# Patient Record
Sex: FEMALE | Race: BLACK OR AFRICAN AMERICAN | NOT HISPANIC OR LATINO | Employment: UNEMPLOYED | URBAN - METROPOLITAN AREA
[De-identification: names, ages, dates, MRNs, and addresses within clinical notes are randomized per-mention and may not be internally consistent; named-entity substitution may affect disease eponyms.]

---

## 2018-05-22 ENCOUNTER — HOSPITAL ENCOUNTER (EMERGENCY)
Facility: MEDICAL CENTER | Age: 43
End: 2018-05-22
Attending: EMERGENCY MEDICINE
Payer: MEDICAID

## 2018-05-22 VITALS
HEART RATE: 96 BPM | BODY MASS INDEX: 27.64 KG/M2 | HEIGHT: 66 IN | OXYGEN SATURATION: 97 % | WEIGHT: 171.96 LBS | TEMPERATURE: 97.9 F | RESPIRATION RATE: 16 BRPM | DIASTOLIC BLOOD PRESSURE: 70 MMHG | SYSTOLIC BLOOD PRESSURE: 121 MMHG

## 2018-05-22 DIAGNOSIS — K02.9 DENTAL CARIES: ICD-10-CM

## 2018-05-22 PROCEDURE — 700102 HCHG RX REV CODE 250 W/ 637 OVERRIDE(OP): Performed by: EMERGENCY MEDICINE

## 2018-05-22 PROCEDURE — 99283 EMERGENCY DEPT VISIT LOW MDM: CPT

## 2018-05-22 PROCEDURE — A9270 NON-COVERED ITEM OR SERVICE: HCPCS | Performed by: EMERGENCY MEDICINE

## 2018-05-22 RX ORDER — AMOXICILLIN 500 MG/1
500 CAPSULE ORAL 3 TIMES DAILY
Qty: 21 CAP | Refills: 0 | Status: SHIPPED | OUTPATIENT
Start: 2018-05-22 | End: 2018-06-01

## 2018-05-22 RX ORDER — NAPROXEN 500 MG/1
500 TABLET ORAL 2 TIMES DAILY WITH MEALS
Qty: 60 TAB | Refills: 0 | Status: SHIPPED | OUTPATIENT
Start: 2018-05-22 | End: 2022-03-09

## 2018-05-22 RX ORDER — NAPROXEN 500 MG/1
500 TABLET ORAL ONCE
Status: COMPLETED | OUTPATIENT
Start: 2018-05-22 | End: 2018-05-22

## 2018-05-22 RX ADMIN — NAPROXEN 500 MG: 500 TABLET ORAL at 12:35

## 2018-05-22 ASSESSMENT — PAIN SCALES - GENERAL
PAINLEVEL_OUTOF10: 5
PAINLEVEL_OUTOF10: 5

## 2018-05-22 NOTE — ED NOTES
Patient received discharge instructions, verbalized understanding and intent to follow. Provided with dental referral sheet. Denied any additional questions or concerns. Stable and ambulatory to discharge with steady gait. Belongings with patient at time of discharge.

## 2018-05-22 NOTE — ED NOTES
Agree with triage note. Patient alert and oriented x 4, respirations even and unlabored. No acute distress. Stretcher low, wheels locked, call light within reach.

## 2018-05-22 NOTE — ED PROVIDER NOTES
ED Provider Note    Scribed for Lisandra Lagunas M.D. by Deonte Andrade. 5/22/2018  12:01 PM    Means of arrival: Walk-in  History obtained from: Patient  History limited by: None    CHIEF COMPLAINT  Chief Complaint   Patient presents with   • Dental Pain     L lower tooth       HPI  Rama Bhatt is a 43 y.o. female who presents to the Emergency Department complaining of left sided dental pain and numbness that began three weeks ago and has been gradually increasing in severity. The patient took leftover amoxicillin today and she has not been able to seen by a dentist. She denies fever.    REVIEW OF SYSTEMS  HEENT:  Positive for dental pain and numbness.  Endocrine: no fevers    See history of present illness.   E    PAST MEDICAL HISTORY   has a past medical history of Bell's palsy (2004).    SURGICAL HISTORY   has a past surgical history that includes gyn surgery.    SOCIAL HISTORY  Social History   Substance Use Topics   • Smoking status: Current Every Day Smoker           Comment: 1/2 ppd   • Alcohol use No      History   Drug Use No       FAMILY HISTORY  None noted.    CURRENT MEDICATIONS  No current facility-administered medications on file prior to encounter.      Current Outpatient Prescriptions on File Prior to Encounter   Medication Sig Dispense Refill   • hydrocodone-acetaminophen (NORCO) 5-325 MG TABS per tablet Take 1-2 Tabs by mouth every 6 hours as needed. 12 Tab 0   • ibuprofen (MOTRIN) 200 MG TABS Take 200 mg by mouth every 6 hours as needed.     • acetaminophen-codeine #3 (TYLENOL #3) 300-30 MG TABS Take 1-2 Tabs by mouth every four hours as needed. Maximum 12 tabs in 24 hours      • Diphenhydramine-APAP, sleep, (TYLENOL PM EXTRA STRENGTH PO) Take  by mouth.     • oxycodone-acetaminophen (PERCOCET) 5-325 MG TABS Take 1-2 Tabs by mouth every four hours as needed for Mild Pain (pain). 20 Each 0       ALLERGIES  Allergies   Allergen Reactions   • Nkda [No Known Drug Allergy]        PHYSICAL  EXAM  VITAL SIGNS:     Constitutional: Well developed, Well nourished, Mild distress secondary to pain, Non-toxic appearance.   HEENT: Normocephalic, Atraumatic,  external ears normal, pharynx pink,  Mucous  Membranes moist, No rhinorrhea or mucosal edema. Dental caries in the left lower posterior jaw. No drooling, trismus, facial swelling. No voice change  Eyes: PERRL, EOMI, Conjunctiva normal, No discharge.   Neck: Normal range of motion, No stiffness, No tenderness, Supple, No stridor.   Lymphatic: No lymphadenopathy    Skin: Warm, Dry, No erythema, No rash,   Musculoskeletal: Good range of motion in all major joints. No tenderness to palpation or major deformities noted.   Neurologic: Alert & awake, no focal deficits  Psychiatric: Affect normal    COURSE & MEDICAL DECISION MAKING  Nursing notes, VS, PMSFHx reviewed in chart.     12:01 PM - Patient seen and examined at bedside. Patient will be treated with naproxen tablet 500 mg.  I discussed her above findings and plans for discharge with a prescription for Naprosyn, Amoxil, and Orajel baby tooth/gum. She was given a referral to [facility] and instructed to return to the ED if her symptoms worsen. Patient understands and agrees.    The patient will return for new or worsening symptoms and is stable at the time of discharge.    DISPOSITION:  Patient will be discharged home in stable condition.    FOLLOW UP:  Selvin Crum D.D.S.  [address] [place] [id number]  [phone]    Call in 1 day  to establish care, for recheck, As needed, If symptoms worsen      OUTPATIENT MEDICATIONS:  New Prescriptions    AMOXICILLIN (AMOXIL) 500 MG CAP    Take 1 Cap by mouth 3 times a day for 10 days.    NAPROXEN (NAPROSYN) 500 MG TAB    Take 1 Tab by mouth 2 times a day, with meals.    ORAL HYGIENE PRODUCTS (ORAJEL BABY TOOTH/GUM) 2-0.12 % GEL    Spray 1 Application in mouth/throat 3 times a day.       FINAL IMPRESSION  1. Dental caries          Deonte GORDON  Raymond (Scribe), am scribing for, and in the presence of, Lisandra Lagunas M.D..    Electronically signed by: Deonte Andrade (Scribe), 5/22/2018    ILisandra M.D. personally performed the services described in this documentation, as scribed by Deonte Andrade in my presence, and it is both accurate and complete.    The note accurately reflects work and decisions made by me.  Lisandra Lagunas  5/22/2018  12:39 PM

## 2018-05-22 NOTE — ED TRIAGE NOTES
"Chief Complaint   Patient presents with   • Dental Pain     L lower tooth     X a few weeks. Pt went to [facility] clinic, can't get tooth taken care of until July. Received abx.     /70   Pulse 96   Temp 36.6 °C (97.9 °F)   Resp 16   Ht 1.676 m (5' 6\")   Wt 78 kg (171 lb 15.3 oz)   SpO2 97%   BMI 27.75 kg/m²     Pt Informed regarding triage process and verbalized understanding to inform triage tech or RN for any changes in condition.  Placed in lobby.    "

## 2018-05-22 NOTE — DISCHARGE INSTRUCTIONS
Dental Caries  Dental caries (cavities) are areas of tooth decay. Cavities are usually caused by a combination of poor dental care; sugar; tobacco, alcohol, and drug abuse; decreased saliva production; and receding gums. If cavities are not treated by a dentist, they grow in size. This can cause toothaches, infection, and loss of the tooth.  Cavities of the outer tooth enamel do not cause symptoms. Dental pain from cold drinks may be the first sign the enamel has broken down and decay has spread toward the root of the tooth. This can cause the tooth to die or become infected. If a cavity is treated before it causes toothache, the tooth can usually be saved. Cavities can be prevented by good oral hygiene. Brushing your teeth in the morning and before bed, and using dental floss once daily helps remove plaque and reduce bacteria.  Candy, soft drinks, and other sources of sugar promote tooth decay by promoting the growth of bacteria in the mouth. Proper diet, fluoride, dental cleaning, and fillings are important in preventing the loss of teeth from decay. Antibiotics, root canal treatment, or dental extraction may be needed if the decay is severe. Take any pain medication or antibiotics as directed by your caregiver. It is important that you follow up with a dentist for definitive care.  SEEK MEDICAL CARE IF:   · You or your child has an oral temperature above 102° F (38.9° C).   · There is difficulty opening the mouth.   · There is difficulty swallowing or handling secretions.   · There is difficulty breathing.   · There is chest pain.   · There are worsening or concerning symptoms.   Document Released: 01/25/2006 Document Revised: 03/11/2013 Document Reviewed: 04/12/2011  ExitCare® Patient Information ©2013 [employer].Dental Care and Dentist Visits  Dental care supports good overall health. Regular dental visits can also help you avoid dental pain, bleeding, infection, and other more serious health problems in  the future. It is important to keep the mouth healthy because diseases in the teeth, gums, and other oral tissues can spread to other areas of the body. Some problems, such as diabetes, heart disease, and pre-term labor have been associated with poor oral health.   See your dentist every 6 months. If you experience emergency problems such as a toothache or broken tooth, go to the dentist right away. If you see your dentist regularly, you may catch problems early. It is easier to be treated for problems in the early stages.   WHAT TO EXPECT AT A DENTIST VISIT   Your dentist will look for many common oral health problems and recommend proper treatment. At your regular dental visit, you can expect:  · Gentle cleaning of the teeth and gums. This includes scraping and polishing. This helps to remove the sticky substance around the teeth and gums (plaque). Plaque forms in the mouth shortly after eating. Over time, plaque hardens on the teeth as tartar. If tartar is not removed regularly, it can cause problems. Cleaning also helps remove stains.  · Periodic X-rays. These pictures of the teeth and supporting bone will help your dentist assess the health of your teeth.  · Periodic fluoride treatments. Fluoride is a natural mineral shown to help strengthen teeth. Fluoride treatment involves applying a fluoride gel or varnish to the teeth. It is most commonly done in children.  · Examination of the mouth, tongue, jaws, teeth, and gums to look for any oral health problems, such as:  ¨ Cavities (dental caries). This is decay on the tooth caused by plaque, sugar, and acid in the mouth. It is best to catch a cavity when it is small.  ¨ Inflammation of the gums caused by plaque buildup (gingivitis).  ¨ Problems with the mouth or malformed or misaligned teeth.  ¨ Oral cancer or other diseases of the soft tissues or jaws.   KEEP YOUR TEETH AND GUMS HEALTHY  For healthy teeth and gums, follow these general guidelines as well as your  dentist's specific advice:  · Have your teeth professionally cleaned at the dentist every 6 months.  · Brush twice daily with a fluoride toothpaste.  · Floss your teeth daily.   · Ask your dentist if you need fluoride supplements, treatments, or fluoride toothpaste.  · Eat a healthy diet. Reduce foods and drinks with added sugar.  · Avoid smoking.  TREATMENT FOR ORAL HEALTH PROBLEMS  If you have oral health problems, treatment varies depending on the conditions present in your teeth and gums.  · Your caregiver will most likely recommend good oral hygiene at each visit.  · For cavities, gingivitis, or other oral health disease, your caregiver will perform a procedure to treat the problem. This is typically done at a separate appointment. Sometimes your caregiver will refer you to another dental specialist for specific tooth problems or for surgery.  SEEK IMMEDIATE DENTAL CARE IF:  · You have pain, bleeding, or soreness in the gum, tooth, jaw, or mouth area.  · A permanent tooth becomes loose or  from the gum socket.  · You experience a blow or injury to the mouth or jaw area.     This information is not intended to replace advice given to you by your health care provider. Make sure you discuss any questions you have with your health care provider.     Document Released: 08/29/2012 Document Revised: 03/11/2013 Document Reviewed: 08/29/2012  [employer] Interactive Patient Education ©2016 [employer]

## 2022-01-24 ENCOUNTER — HOSPITAL ENCOUNTER (OUTPATIENT)
Dept: LAB | Facility: MEDICAL CENTER | Age: 47
End: 2022-01-24
Attending: PHYSICIAN ASSISTANT
Payer: MEDICAID

## 2022-01-24 ENCOUNTER — OFFICE VISIT (OUTPATIENT)
Dept: URGENT CARE | Facility: CLINIC | Age: 47
End: 2022-01-24
Payer: MEDICAID

## 2022-01-24 ENCOUNTER — HOSPITAL ENCOUNTER (OUTPATIENT)
Dept: RADIOLOGY | Facility: MEDICAL CENTER | Age: 47
End: 2022-01-24
Attending: PHYSICIAN ASSISTANT
Payer: MEDICAID

## 2022-01-24 VITALS
TEMPERATURE: 96.9 F | DIASTOLIC BLOOD PRESSURE: 76 MMHG | HEIGHT: 67 IN | WEIGHT: 190.4 LBS | BODY MASS INDEX: 29.88 KG/M2 | RESPIRATION RATE: 12 BRPM | SYSTOLIC BLOOD PRESSURE: 122 MMHG | OXYGEN SATURATION: 94 % | HEART RATE: 96 BPM

## 2022-01-24 DIAGNOSIS — R51.9 NONINTRACTABLE HEADACHE, UNSPECIFIED CHRONICITY PATTERN, UNSPECIFIED HEADACHE TYPE: ICD-10-CM

## 2022-01-24 DIAGNOSIS — H53.9 VISION CHANGES: ICD-10-CM

## 2022-01-24 DIAGNOSIS — Q67.0 FACIAL ASYMMETRY: ICD-10-CM

## 2022-01-24 LAB
ANION GAP SERPL CALC-SCNC: 13 MMOL/L (ref 7–16)
BASOPHILS # BLD AUTO: 1.3 % (ref 0–1.8)
BASOPHILS # BLD: 0.18 K/UL (ref 0–0.12)
BUN SERPL-MCNC: 6 MG/DL (ref 8–22)
CALCIUM SERPL-MCNC: 9.4 MG/DL (ref 8.5–10.5)
CHLORIDE SERPL-SCNC: 102 MMOL/L (ref 96–112)
CO2 SERPL-SCNC: 24 MMOL/L (ref 20–33)
CREAT SERPL-MCNC: 0.73 MG/DL (ref 0.5–1.4)
EOSINOPHIL # BLD AUTO: 0.06 K/UL (ref 0–0.51)
EOSINOPHIL NFR BLD: 0.4 % (ref 0–6.9)
ERYTHROCYTE [DISTWIDTH] IN BLOOD BY AUTOMATED COUNT: 47.6 FL (ref 35.9–50)
GLUCOSE SERPL-MCNC: 138 MG/DL (ref 65–99)
HCT VFR BLD AUTO: 42.7 % (ref 37–47)
HGB BLD-MCNC: 14.6 G/DL (ref 12–16)
IMM GRANULOCYTES # BLD AUTO: 0.05 K/UL (ref 0–0.11)
IMM GRANULOCYTES NFR BLD AUTO: 0.4 % (ref 0–0.9)
LYMPHOCYTES # BLD AUTO: 4.28 K/UL (ref 1–4.8)
LYMPHOCYTES NFR BLD: 30.9 % (ref 22–41)
MCH RBC QN AUTO: 31.6 PG (ref 27–33)
MCHC RBC AUTO-ENTMCNC: 34.2 G/DL (ref 33.6–35)
MCV RBC AUTO: 92.4 FL (ref 81.4–97.8)
MONOCYTES # BLD AUTO: 0.68 K/UL (ref 0–0.85)
MONOCYTES NFR BLD AUTO: 4.9 % (ref 0–13.4)
NEUTROPHILS # BLD AUTO: 8.6 K/UL (ref 2–7.15)
NEUTROPHILS NFR BLD: 62.1 % (ref 44–72)
NRBC # BLD AUTO: 0 K/UL
NRBC BLD-RTO: 0 /100 WBC
PLATELET # BLD AUTO: 390 K/UL (ref 164–446)
PMV BLD AUTO: 9.8 FL (ref 9–12.9)
POTASSIUM SERPL-SCNC: 3.7 MMOL/L (ref 3.6–5.5)
RBC # BLD AUTO: 4.62 M/UL (ref 4.2–5.4)
SODIUM SERPL-SCNC: 139 MMOL/L (ref 135–145)
TSH SERPL DL<=0.005 MIU/L-ACNC: 3.4 UIU/ML (ref 0.38–5.33)
WBC # BLD AUTO: 13.9 K/UL (ref 4.8–10.8)

## 2022-01-24 PROCEDURE — 99204 OFFICE O/P NEW MOD 45 MIN: CPT | Performed by: PHYSICIAN ASSISTANT

## 2022-01-24 PROCEDURE — 80048 BASIC METABOLIC PNL TOTAL CA: CPT

## 2022-01-24 PROCEDURE — 85025 COMPLETE CBC W/AUTO DIFF WBC: CPT

## 2022-01-24 PROCEDURE — 84443 ASSAY THYROID STIM HORMONE: CPT

## 2022-01-24 PROCEDURE — 70450 CT HEAD/BRAIN W/O DYE: CPT

## 2022-01-24 PROCEDURE — 36415 COLL VENOUS BLD VENIPUNCTURE: CPT

## 2022-01-25 ASSESSMENT — ENCOUNTER SYMPTOMS
FOCAL WEAKNESS: 0
VOMITING: 0
EYE REDNESS: 0
EYE DISCHARGE: 0
SENSORY CHANGE: 0
FOREIGN BODY SENSATION: 0
DIZZINESS: 0
TINGLING: 0
SHORTNESS OF BREATH: 0
PHOTOPHOBIA: 0
FEVER: 0
NAUSEA: 0
COUGH: 0
HEADACHES: 1
BLURRED VISION: 1
MYALGIAS: 1
DOUBLE VISION: 0

## 2022-01-26 DIAGNOSIS — Q67.0 FACIAL ASYMMETRY: ICD-10-CM

## 2022-01-26 NOTE — PROGRESS NOTES
Subjective     Rama Bhatt is a 46 y.o. female who presents with Eye Problem (bilateral eyes (L) pain, blurred vision, head pain. Swollen throat. x 2/3 weeks (pt concerned thyroid)  )            Patient is a 46-year-old female who presents to urgent care with concern regarding her left eye.  Patient reports that she noticed approximately 3 to 4 weeks ago that her left eye appears to be bigger than her right.  Since then patient has noticed subtle symptoms of headaches, intermittent blurry vision of which she will develop episodes where her vision is blurry and needing to focus more.  She is uncertain which eye this is present and as she will of them in both eyes sometimes.  Patient does report a left-sided headache and readily admits that it is unusual for her to get headaches of which has been present intermittently for the last 2 weeks.  On further discussion patient does have history of Bell's palsy of which the current symptoms she has are slightly different.  Previously with Bell's palsy she would have tingling to the face of which she is currently not having at this time.  Of note on exam it was noted that patient may have facial asymmetry-patient was unaware of such as she did not feel that the asymmetry was to her face more to her eyes.    Eye Problem   The left eye is affected. This is a new problem. The current episode started 1 to 4 weeks ago. The problem occurs daily. The problem has been unchanged. There was no injury mechanism. Associated symptoms include blurred vision. Pertinent negatives include no eye discharge, double vision, eye redness, fever, foreign body sensation, nausea, photophobia, tingling or vomiting.       Review of Systems   Constitutional: Positive for malaise/fatigue. Negative for fever.   Eyes: Positive for blurred vision. Negative for double vision, photophobia, discharge and redness.   Respiratory: Negative for cough and shortness of breath.    Cardiovascular: Negative  "for chest pain and leg swelling.   Gastrointestinal: Negative for nausea and vomiting.   Musculoskeletal: Positive for myalgias.   Neurological: Positive for headaches. Negative for dizziness, tingling, sensory change and focal weakness.   All other systems reviewed and are negative.             Objective     /76   Pulse 96   Temp 36.1 °C (96.9 °F)   Resp 12   Ht 1.702 m (5' 7\")   Wt 86.4 kg (190 lb 6.4 oz)   SpO2 94%   BMI 29.82 kg/m²    PMH:  has a past medical history of Bell's palsy ().  MEDS: Reviewed .   ALLERGIES:   Allergies   Allergen Reactions   • Nkda [No Known Drug Allergy]      SURGHX:   Past Surgical History:   Procedure Laterality Date   • GYN SURGERY           SOCHX:  reports that she has been smoking. She has never used smokeless tobacco. She reports that she does not drink alcohol and does not use drugs.  FH: Family history was reviewed, no pertinent findings to report    Physical Exam  Vitals reviewed.   Constitutional:       General: She is not in acute distress.     Appearance: She is well-developed.   HENT:      Head: Normocephalic and atraumatic.      Right Ear: External ear normal.      Left Ear: External ear normal.      Nose: No congestion.      Mouth/Throat:      Comments: Poor dentition.     Eyes:      General: No visual field deficit.     Conjunctiva/sclera: Conjunctivae normal.      Pupils: Pupils are equal, round, and reactive to light.   Neck:      Trachea: No tracheal deviation.   Cardiovascular:      Rate and Rhythm: Normal rate.   Pulmonary:      Effort: Pulmonary effort is normal.   Musculoskeletal:         General: Normal range of motion.      Cervical back: Normal range of motion and neck supple.   Skin:     General: Skin is warm.      Findings: No rash.      Comments: No rash to area exposed during the visit today.    Neurological:      Mental Status: She is alert and oriented to person, place, and time.      Cranial Nerves: Facial asymmetry present. "      Motor: No weakness.      Coordination: Romberg sign negative. Coordination normal. Finger-Nose-Finger Test normal. Rapid alternating movements normal.      Gait: Gait normal.   Psychiatric:         Behavior: Behavior normal.         Thought Content: Thought content normal.         Judgment: Judgment normal.                             Assessment & Plan        1. Facial asymmetry  - CT-HEAD W/O; Future  - CBC WITH DIFFERENTIAL; Future  - Basic Metabolic Panel; Future  - TSH; Future    2. Vision changes  - CT-HEAD W/O; Future  - CBC WITH DIFFERENTIAL; Future  - Basic Metabolic Panel; Future  - TSH; Future    3. Nonintractable headache, unspecified chronicity pattern, unspecified headache type  - CT-HEAD W/O; Future  - CBC WITH DIFFERENTIAL; Future  - Basic Metabolic Panel; Future  - TSH; Future            Does appear that patient is with noted facial droop on the right side.  Patient does have history of Bell's palsy of which she does report previous utilization of antivirals along with steroids did not make a difference to symptoms.  Patient is also 2 to 3 weeks in the least from onset of facial asymmetry as she noticed discrepancy in her eye size approximately 4 weeks ago.  As patient does have associated visual changes along with noted headache a CT head was ordered for further evaluation of intracranial process.  Patient was specifically concerned regarding possible thyroid disease today patient has not had updated blood work will send off for thyroid, along with renal function and electrolyte functions.  [facility].    Appropriate PPE worn at all times by provider.   Pt. Had face mask on throughout entirety of the visit other than oropharyngeal examination today.     Side effects of OTC or prescribed medications discussed.     DDX, Supportive care, and indications for immediate follow-up discussed with patient.    Instructed to return to clinic or nearest  emergency department if we are not available for any change in condition, further concerns, or worsening of symptoms.    The patient and/or guardian demonstrated a good understanding and agreed with the treatment plan.    Please note that this dictation was created using voice recognition software. I have made every reasonable attempt to correct obvious errors, but I expect that there are errors of grammar and possibly content that I did not discover before finalizing the note.

## 2022-01-27 ENCOUNTER — TELEPHONE (OUTPATIENT)
Dept: SCHEDULING | Facility: IMAGING CENTER | Age: 47
End: 2022-01-27

## 2022-03-09 ENCOUNTER — OFFICE VISIT (OUTPATIENT)
Dept: MEDICAL GROUP | Facility: MEDICAL CENTER | Age: 47
End: 2022-03-09
Attending: NURSE PRACTITIONER
Payer: MEDICAID

## 2022-03-09 VITALS
SYSTOLIC BLOOD PRESSURE: 112 MMHG | RESPIRATION RATE: 16 BRPM | BODY MASS INDEX: 27.99 KG/M2 | WEIGHT: 178.3 LBS | OXYGEN SATURATION: 96 % | TEMPERATURE: 98.4 F | DIASTOLIC BLOOD PRESSURE: 78 MMHG | HEIGHT: 67 IN | HEART RATE: 98 BPM

## 2022-03-09 DIAGNOSIS — Z23 NEED FOR VACCINATION: ICD-10-CM

## 2022-03-09 DIAGNOSIS — Z13.29 SCREENING FOR ENDOCRINE, NUTRITIONAL, METABOLIC AND IMMUNITY DISORDER: ICD-10-CM

## 2022-03-09 DIAGNOSIS — Z12.31 ENCOUNTER FOR SCREENING MAMMOGRAM FOR MALIGNANT NEOPLASM OF BREAST: ICD-10-CM

## 2022-03-09 DIAGNOSIS — Z13.21 SCREENING FOR ENDOCRINE, NUTRITIONAL, METABOLIC AND IMMUNITY DISORDER: ICD-10-CM

## 2022-03-09 DIAGNOSIS — T78.40XA ALLERGY, INITIAL ENCOUNTER: ICD-10-CM

## 2022-03-09 DIAGNOSIS — Z13.0 SCREENING FOR ENDOCRINE, NUTRITIONAL, METABOLIC AND IMMUNITY DISORDER: ICD-10-CM

## 2022-03-09 DIAGNOSIS — Z13.228 SCREENING FOR ENDOCRINE, NUTRITIONAL, METABOLIC AND IMMUNITY DISORDER: ICD-10-CM

## 2022-03-09 DIAGNOSIS — Z76.89 ENCOUNTER TO ESTABLISH CARE: ICD-10-CM

## 2022-03-09 PROCEDURE — 99203 OFFICE O/P NEW LOW 30 MIN: CPT | Mod: 25 | Performed by: NURSE PRACTITIONER

## 2022-03-09 PROCEDURE — 90715 TDAP VACCINE 7 YRS/> IM: CPT

## 2022-03-09 PROCEDURE — 99213 OFFICE O/P EST LOW 20 MIN: CPT | Mod: 25 | Performed by: NURSE PRACTITIONER

## 2022-03-09 PROCEDURE — 99214 OFFICE O/P EST MOD 30 MIN: CPT | Performed by: NURSE PRACTITIONER

## 2022-03-09 RX ORDER — LORATADINE 10 MG/1
10 TABLET ORAL DAILY
Qty: 30 TABLET | Refills: 0 | Status: SHIPPED | OUTPATIENT
Start: 2022-03-09 | End: 2023-12-04

## 2022-03-09 ASSESSMENT — PATIENT HEALTH QUESTIONNAIRE - PHQ9: CLINICAL INTERPRETATION OF PHQ2 SCORE: 0

## 2022-03-10 NOTE — ASSESSMENT & PLAN NOTE
Discussed health history and maintenance   Flu vaccine - Declined   Colon Ca screening - Not applicable   Mammogram- Ordered   Pap smear - Scheduled- to be scheduled here   STD Screening- Ordered  Preventative screening labs ordered - Ordered will have patient follow up in 2-4 weeks  Tdap provided

## 2022-03-10 NOTE — ASSESSMENT & PLAN NOTE
Given prolonged duration and no obvious signs of infection we will trial Claritin to see if this is allergy triggered   Throat has some cobblestoning appearance to posterior pharynx, TM clear and non bulging.   Will follow up with me in 2 weeks if no improvement.

## 2022-03-10 NOTE — PROGRESS NOTES
Chief Complaint   Patient presents with   • Establish Care       Subjective:     HPI:   Rama Bhatt is a 46 y.o. female here to discuss the evaluation and management of:        Problem   Encounter to Establish Care    Patient here to establish care. Has not had a primary in some time. Was recently seen about a month ago at urgent care for possible [place] Palsy episode. Had also been having headaches at the time so a CT scan of her head was completed. No abnormal findings noted. Patient was concerned that maybe there was an issue with her thyroid. TSh levels came back within normal limits.      Allergies    Patient has had persistent mucus and congestion for about a month. Denies fevers, chills. Does state some ringing and pressure in her ears which has benefited from Nyquil. States she feels like she keeps coughing up a lot of mucus.          ROS  See HPI       Allergies   Allergen Reactions   • Nkda [No Known Drug Allergy]        Current medicines (including changes today)  Current Outpatient Medications   Medication Sig Dispense Refill   • loratadine (CLARITIN) 10 MG Tab Take 1 Tablet by mouth every day. 30 Tablet 0     No current facility-administered medications for this visit.       Social History     Tobacco Use   • Smoking status: Current Every Day Smoker     Packs/day: 0.50     Years: 7.00     Pack years: 3.50   • Smokeless tobacco: Never Used   Vaping Use   • Vaping Use: Never used   Substance Use Topics   • Alcohol use: Not Currently     Comment: Pt. states she quit apx. 1 mo ago when she noticed her blood sugars were high. CS03/09/2022   • Drug use: Yes     Types: Marijuana, Inhaled     Comment: On occasional use, apx one time a month 03/09/2022       Patient Active Problem List    Diagnosis Date Noted   • Encounter to establish care 03/09/2022   • Allergies 03/09/2022       Family History   Problem Relation Age of Onset   • Diabetes Mother    • Diabetes Father           Objective:     BP  "112/78 (BP Location: Right arm, Patient Position: Sitting, BP Cuff Size: Adult)   Pulse 98   Temp 36.9 °C (98.4 °F) (Temporal)   Resp 16   Ht 1.698 m (5' 6.85\")   Wt 80.9 kg (178 lb 4.8 oz)   SpO2 96%  Body mass index is 28.05 kg/m².    Physical Exam:  Physical Exam  Vitals reviewed.   Constitutional:       General: She is awake.      Appearance: Normal appearance. She is well-developed.   HENT:      Head: Normocephalic.      Right Ear: Tympanic membrane, ear canal and external ear normal.      Left Ear: Tympanic membrane, ear canal and external ear normal.      Nose: Nose normal.      Mouth/Throat:      Mouth: Mucous membranes are moist.      Pharynx: Oropharynx is clear. Posterior oropharyngeal erythema present. No oropharyngeal exudate.   Eyes:      Conjunctiva/sclera: Conjunctivae normal.   Neck:      Thyroid: No thyroid mass or thyroid tenderness.   Cardiovascular:      Rate and Rhythm: Normal rate and regular rhythm.      Heart sounds: Normal heart sounds.   Pulmonary:      Effort: Pulmonary effort is normal. No respiratory distress.      Breath sounds: Normal breath sounds. No wheezing.   Musculoskeletal:      Cervical back: Neck supple. No tenderness. No muscular tenderness.   Lymphadenopathy:      Cervical: Cervical adenopathy present.      Right cervical: Posterior cervical adenopathy present.      Left cervical: Posterior cervical adenopathy present.   Skin:     General: Skin is warm and dry.   Neurological:      Mental Status: She is alert and oriented to person, place, and time.   Psychiatric:         Mood and Affect: Mood normal.         Behavior: Behavior normal. Behavior is cooperative.         Assessment and Plan:     The following treatment plan was discussed:    Problem List Items Addressed This Visit     Encounter to establish care     Discussed health history and maintenance   Flu vaccine - Declined   Colon Ca screening - Not applicable   Mammogram- Ordered   Pap smear - Scheduled- to be " scheduled here   STD Screening- Ordered  Preventative screening labs ordered - Ordered will have patient follow up in 2-4 weeks  Tdap provided            Allergies     Given prolonged duration and no obvious signs of infection we will trial Claritin to see if this is allergy triggered   Throat has some cobblestoning appearance to posterior pharynx, TM clear and non bulging.   Will follow up with me in 2 weeks if no improvement.          Relevant Medications    loratadine (CLARITIN) 10 MG Tab      Other Visit Diagnoses     Encounter for screening mammogram for malignant neoplasm of breast        Relevant Orders    MA-SCREENING MAMMO BILAT W/TOMOSYNTHESIS W/CAD    Need for vaccination        Relevant Orders    Tdap =>6yo IM    Screening for endocrine, nutritional, metabolic and immunity disorder        Relevant Orders    HEMOGLOBIN A1C    TSH    FREE THYROXINE    HEP C VIRUS ANTIBODY    Comp Metabolic Panel    Lipid Profile    CBC WITH DIFFERENTIAL    VITAMIN D,25 HYDROXY          Any change or worsening of signs or symptoms, patient encouraged to follow-up or report to emergency room for further evaluation. Patient verbalizes understanding and agrees.    Follow-Up: Return in about 4 weeks (around 4/6/2022) for Follow up Labs, Pap.      PLEASE NOTE: This dictation was created using voice recognition software. I have made every reasonable attempt to correct obvious errors, but I expect that there are errors of grammar and possibly content that I did not discover before finalizing the note.

## 2022-03-21 ENCOUNTER — HOSPITAL ENCOUNTER (OUTPATIENT)
Dept: LAB | Facility: MEDICAL CENTER | Age: 47
End: 2022-03-21
Attending: NURSE PRACTITIONER
Payer: MEDICAID

## 2022-03-21 DIAGNOSIS — Z13.29 SCREENING FOR ENDOCRINE, NUTRITIONAL, METABOLIC AND IMMUNITY DISORDER: ICD-10-CM

## 2022-03-21 DIAGNOSIS — Z13.228 SCREENING FOR ENDOCRINE, NUTRITIONAL, METABOLIC AND IMMUNITY DISORDER: ICD-10-CM

## 2022-03-21 DIAGNOSIS — Z13.21 SCREENING FOR ENDOCRINE, NUTRITIONAL, METABOLIC AND IMMUNITY DISORDER: ICD-10-CM

## 2022-03-21 DIAGNOSIS — Z13.0 SCREENING FOR ENDOCRINE, NUTRITIONAL, METABOLIC AND IMMUNITY DISORDER: ICD-10-CM

## 2022-03-21 LAB
25(OH)D3 SERPL-MCNC: 17 NG/ML (ref 30–100)
ALBUMIN SERPL BCP-MCNC: 4.7 G/DL (ref 3.2–4.9)
ALBUMIN/GLOB SERPL: 1.9 G/DL
ALP SERPL-CCNC: 80 U/L (ref 30–99)
ALT SERPL-CCNC: 19 U/L (ref 2–50)
ANION GAP SERPL CALC-SCNC: 14 MMOL/L (ref 7–16)
AST SERPL-CCNC: 23 U/L (ref 12–45)
BASOPHILS # BLD AUTO: 1.4 % (ref 0–1.8)
BASOPHILS # BLD: 0.17 K/UL (ref 0–0.12)
BILIRUB SERPL-MCNC: 0.2 MG/DL (ref 0.1–1.5)
BUN SERPL-MCNC: 4 MG/DL (ref 8–22)
CALCIUM SERPL-MCNC: 9.4 MG/DL (ref 8.5–10.5)
CHLORIDE SERPL-SCNC: 103 MMOL/L (ref 96–112)
CHOLEST SERPL-MCNC: 251 MG/DL (ref 100–199)
CO2 SERPL-SCNC: 22 MMOL/L (ref 20–33)
CREAT SERPL-MCNC: 0.65 MG/DL (ref 0.5–1.4)
EOSINOPHIL # BLD AUTO: 0.07 K/UL (ref 0–0.51)
EOSINOPHIL NFR BLD: 0.6 % (ref 0–6.9)
ERYTHROCYTE [DISTWIDTH] IN BLOOD BY AUTOMATED COUNT: 50.5 FL (ref 35.9–50)
EST. AVERAGE GLUCOSE BLD GHB EST-MCNC: 126 MG/DL
FASTING STATUS PATIENT QL REPORTED: NORMAL
GFR SERPLBLD CREATININE-BSD FMLA CKD-EPI: 109 ML/MIN/1.73 M 2
GLOBULIN SER CALC-MCNC: 2.5 G/DL (ref 1.9–3.5)
GLUCOSE SERPL-MCNC: 90 MG/DL (ref 65–99)
HBA1C MFR BLD: 6 % (ref 4–5.6)
HCT VFR BLD AUTO: 40.7 % (ref 37–47)
HCV AB SER QL: NORMAL
HDLC SERPL-MCNC: 44 MG/DL
HGB BLD-MCNC: 13.3 G/DL (ref 12–16)
IMM GRANULOCYTES # BLD AUTO: 0.05 K/UL (ref 0–0.11)
IMM GRANULOCYTES NFR BLD AUTO: 0.4 % (ref 0–0.9)
LDLC SERPL CALC-MCNC: 165 MG/DL
LYMPHOCYTES # BLD AUTO: 2.99 K/UL (ref 1–4.8)
LYMPHOCYTES NFR BLD: 25.2 % (ref 22–41)
MCH RBC QN AUTO: 30.4 PG (ref 27–33)
MCHC RBC AUTO-ENTMCNC: 32.7 G/DL (ref 33.6–35)
MCV RBC AUTO: 93.1 FL (ref 81.4–97.8)
MONOCYTES # BLD AUTO: 0.53 K/UL (ref 0–0.85)
MONOCYTES NFR BLD AUTO: 4.5 % (ref 0–13.4)
NEUTROPHILS # BLD AUTO: 8.06 K/UL (ref 2–7.15)
NEUTROPHILS NFR BLD: 67.9 % (ref 44–72)
NRBC # BLD AUTO: 0 K/UL
NRBC BLD-RTO: 0 /100 WBC
PLATELET # BLD AUTO: 337 K/UL (ref 164–446)
PMV BLD AUTO: 10.7 FL (ref 9–12.9)
POTASSIUM SERPL-SCNC: 4.3 MMOL/L (ref 3.6–5.5)
PROT SERPL-MCNC: 7.2 G/DL (ref 6–8.2)
RBC # BLD AUTO: 4.37 M/UL (ref 4.2–5.4)
SODIUM SERPL-SCNC: 139 MMOL/L (ref 135–145)
T4 FREE SERPL-MCNC: 1.24 NG/DL (ref 0.93–1.7)
TRIGL SERPL-MCNC: 208 MG/DL (ref 0–149)
TSH SERPL DL<=0.005 MIU/L-ACNC: 1.9 UIU/ML (ref 0.38–5.33)
WBC # BLD AUTO: 11.9 K/UL (ref 4.8–10.8)

## 2022-03-21 PROCEDURE — 84439 ASSAY OF FREE THYROXINE: CPT

## 2022-03-21 PROCEDURE — 36415 COLL VENOUS BLD VENIPUNCTURE: CPT

## 2022-03-21 PROCEDURE — 86803 HEPATITIS C AB TEST: CPT

## 2022-03-21 PROCEDURE — 80061 LIPID PANEL: CPT

## 2022-03-21 PROCEDURE — 83036 HEMOGLOBIN GLYCOSYLATED A1C: CPT

## 2022-03-21 PROCEDURE — 85025 COMPLETE CBC W/AUTO DIFF WBC: CPT

## 2022-03-21 PROCEDURE — 82306 VITAMIN D 25 HYDROXY: CPT

## 2022-03-21 PROCEDURE — 80053 COMPREHEN METABOLIC PANEL: CPT

## 2022-03-21 PROCEDURE — 84443 ASSAY THYROID STIM HORMONE: CPT

## 2022-03-28 ENCOUNTER — OFFICE VISIT (OUTPATIENT)
Dept: MEDICAL GROUP | Facility: MEDICAL CENTER | Age: 47
End: 2022-03-28
Attending: NURSE PRACTITIONER
Payer: MEDICAID

## 2022-03-28 VITALS
BODY MASS INDEX: 28.75 KG/M2 | RESPIRATION RATE: 18 BRPM | DIASTOLIC BLOOD PRESSURE: 72 MMHG | TEMPERATURE: 96.8 F | WEIGHT: 183.2 LBS | OXYGEN SATURATION: 96 % | SYSTOLIC BLOOD PRESSURE: 120 MMHG | HEART RATE: 110 BPM | HEIGHT: 67 IN

## 2022-03-28 DIAGNOSIS — E78.2 MIXED HYPERLIPIDEMIA: ICD-10-CM

## 2022-03-28 DIAGNOSIS — E55.9 VITAMIN D DEFICIENCY: ICD-10-CM

## 2022-03-28 PROCEDURE — 99214 OFFICE O/P EST MOD 30 MIN: CPT | Performed by: NURSE PRACTITIONER

## 2022-03-28 PROCEDURE — 99212 OFFICE O/P EST SF 10 MIN: CPT | Performed by: NURSE PRACTITIONER

## 2022-03-28 RX ORDER — ERGOCALCIFEROL 1.25 MG/1
50000 CAPSULE ORAL
Qty: 8 CAPSULE | Refills: 0 | Status: SHIPPED | OUTPATIENT
Start: 2022-03-28 | End: 2022-05-31

## 2022-03-28 RX ORDER — SIMVASTATIN 40 MG
40 TABLET ORAL NIGHTLY
Qty: 30 TABLET | Refills: 11 | Status: SHIPPED | OUTPATIENT
Start: 2022-03-28 | End: 2023-04-25

## 2022-03-28 ASSESSMENT — FIBROSIS 4 INDEX: FIB4 SCORE: 0.74

## 2022-03-28 NOTE — ASSESSMENT & PLAN NOTE
New diagnosis-  Ergocalciferol 50,000 units once weekly for 8 weeks  Once high-dose therapy is completed we will transition to over-the-counter supplementation at 2000 to 4000 units daily of vitamin D3

## 2022-03-28 NOTE — PROGRESS NOTES
No chief complaint on file.      Subjective:     HPI:   Rama Bhatt is a 46 y.o. female here to discuss the evaluation and management of:      Problem   Vitamin D Deficiency    Patient noted to be deficient in vitamin D on recent lab work with a level of 17.     Mixed Hyperlipidemia    Patient here for follow-up, was noted to have mixed hyperlipidemia on recent lab work.  Lab Results   Component Value Date/Time    CHOLSTRLTOT 251 (H) 03/21/2022 12:24 PM     (H) 03/21/2022 12:24 PM    HDL 44 03/21/2022 12:24 PM    TRIGLYCERIDE 208 (H) 03/21/2022 12:24 PM                ROS  See HPI     Allergies   Allergen Reactions   • Nkda [No Known Drug Allergy]        Current medicines (including changes today)  Current Outpatient Medications   Medication Sig Dispense Refill   • ergocalciferol (DRISDOL) 54536 UNIT capsule Take 1 Capsule by mouth every 7 days. 8 Capsule 0   • simvastatin (ZOCOR) 40 MG Tab Take 1 Tablet by mouth every evening. 30 Tablet 11   • loratadine (CLARITIN) 10 MG Tab Take 1 Tablet by mouth every day. 30 Tablet 0     No current facility-administered medications for this visit.       Social History     Tobacco Use   • Smoking status: Current Every Day Smoker     Packs/day: 0.50     Years: 7.00     Pack years: 3.50   • Smokeless tobacco: Never Used   Vaping Use   • Vaping Use: Never used   Substance Use Topics   • Alcohol use: Not Currently     Comment: Pt. states she quit apx. 1 mo ago when she noticed her blood sugars were high. CS03/09/2022   • Drug use: Yes     Types: Marijuana, Inhaled     Comment: On occasional use, apx one time a month CS03/09/2022       Patient Active Problem List    Diagnosis Date Noted   • Vitamin D deficiency 03/28/2022   • Mixed hyperlipidemia 03/28/2022   • Encounter to establish care 03/09/2022   • Allergies 03/09/2022       Family History   Problem Relation Age of Onset   • Diabetes Mother    • Diabetes Father           Objective:     /72 (BP Location:  "Left arm, Patient Position: Sitting, BP Cuff Size: Adult)   Pulse (!) 110   Temp 36 °C (96.8 °F) (Skin)   Resp 18   Ht 1.702 m (5' 7\")   Wt 83.1 kg (183 lb 3.2 oz)   SpO2 96%  Body mass index is 28.69 kg/m².    Physical Exam:  Physical Exam  Vitals reviewed.   Constitutional:       General: She is awake.      Appearance: Normal appearance. She is well-developed.   HENT:      Head: Normocephalic.   Eyes:      Conjunctiva/sclera: Conjunctivae normal.   Cardiovascular:      Rate and Rhythm: Normal rate.   Pulmonary:      Effort: Pulmonary effort is normal. No respiratory distress.   Musculoskeletal:      Cervical back: Neck supple.   Skin:     General: Skin is warm and dry.   Neurological:      Mental Status: She is alert and oriented to person, place, and time.   Psychiatric:         Mood and Affect: Mood normal.         Behavior: Behavior normal. Behavior is cooperative.              Assessment and Plan:     The following treatment plan was discussed:    Problem List Items Addressed This Visit     Vitamin D deficiency     New diagnosis-  Ergocalciferol 50,000 units once weekly for 8 weeks  Once high-dose therapy is completed we will transition to over-the-counter supplementation at 2000 to 4000 units daily of vitamin D3         Relevant Medications    ergocalciferol (DRISDOL) 19761 UNIT capsule    Mixed hyperlipidemia     New diagnosis-  Patient initiated on statin therapy  Dietary recommendations discussed and patient verbalized understanding  Encouraged low-fat healthy diet and increased exercise  We will recheck labs in 6 months to ensure adequate response         Relevant Medications    simvastatin (ZOCOR) 40 MG Tab          Any change or worsening of signs or symptoms, patient encouraged to follow-up or report to emergency room for further evaluation. Patient verbalizes understanding and agrees.    Follow-Up: Return in about 6 months (around 9/28/2022) for Follow up Labs.      PLEASE NOTE: This dictation " was created using voice recognition software. I have made every reasonable attempt to correct obvious errors, but I expect that there are errors of grammar and possibly content that I did not discover before finalizing the note.

## 2022-03-28 NOTE — ASSESSMENT & PLAN NOTE
New diagnosis-  Patient initiated on statin therapy  Dietary recommendations discussed and patient verbalized understanding  Encouraged low-fat healthy diet and increased exercise  We will recheck labs in 6 months to ensure adequate response

## 2022-03-31 ENCOUNTER — APPOINTMENT (OUTPATIENT)
Dept: RADIOLOGY | Facility: MEDICAL CENTER | Age: 47
End: 2022-03-31
Attending: NURSE PRACTITIONER
Payer: MEDICAID

## 2022-05-29 DIAGNOSIS — E55.9 VITAMIN D DEFICIENCY: ICD-10-CM

## 2022-05-31 RX ORDER — ERGOCALCIFEROL 1.25 MG/1
CAPSULE ORAL
Qty: 4 CAPSULE | Refills: 0 | Status: SHIPPED | OUTPATIENT
Start: 2022-05-31 | End: 2023-12-04

## 2022-09-26 DIAGNOSIS — Z13.21 SCREENING FOR ENDOCRINE, NUTRITIONAL, METABOLIC AND IMMUNITY DISORDER: ICD-10-CM

## 2022-09-26 DIAGNOSIS — E78.2 MIXED HYPERLIPIDEMIA: ICD-10-CM

## 2022-09-26 DIAGNOSIS — Z13.228 SCREENING FOR ENDOCRINE, NUTRITIONAL, METABOLIC AND IMMUNITY DISORDER: ICD-10-CM

## 2022-09-26 DIAGNOSIS — E55.9 VITAMIN D DEFICIENCY: ICD-10-CM

## 2022-09-26 DIAGNOSIS — Z13.29 SCREENING FOR ENDOCRINE, NUTRITIONAL, METABOLIC AND IMMUNITY DISORDER: ICD-10-CM

## 2022-09-26 DIAGNOSIS — Z13.0 SCREENING FOR ENDOCRINE, NUTRITIONAL, METABOLIC AND IMMUNITY DISORDER: ICD-10-CM

## 2023-04-22 DIAGNOSIS — E78.2 MIXED HYPERLIPIDEMIA: ICD-10-CM

## 2023-04-25 RX ORDER — SIMVASTATIN 40 MG
TABLET ORAL
Qty: 30 TABLET | Refills: 0 | Status: SHIPPED | OUTPATIENT
Start: 2023-04-25 | End: 2023-05-30

## 2023-12-04 ENCOUNTER — APPOINTMENT (OUTPATIENT)
Dept: RADIOLOGY | Facility: MEDICAL CENTER | Age: 48
DRG: 058 | End: 2023-12-04
Attending: EMERGENCY MEDICINE
Payer: MEDICAID

## 2023-12-04 ENCOUNTER — HOSPITAL ENCOUNTER (INPATIENT)
Facility: MEDICAL CENTER | Age: 48
LOS: 5 days | DRG: 058 | End: 2023-12-09
Attending: EMERGENCY MEDICINE | Admitting: STUDENT IN AN ORGANIZED HEALTH CARE EDUCATION/TRAINING PROGRAM
Payer: MEDICAID

## 2023-12-04 DIAGNOSIS — J18.9 PNEUMONIA OF LEFT LUNG DUE TO INFECTIOUS ORGANISM, UNSPECIFIED PART OF LUNG: ICD-10-CM

## 2023-12-04 DIAGNOSIS — G35 MULTIPLE SCLEROSIS (HCC): ICD-10-CM

## 2023-12-04 DIAGNOSIS — R26.2 DIFFICULTY WALKING: ICD-10-CM

## 2023-12-04 DIAGNOSIS — R53.1 RIGHT SIDED WEAKNESS: ICD-10-CM

## 2023-12-04 DIAGNOSIS — U07.1 COVID-19: ICD-10-CM

## 2023-12-04 DIAGNOSIS — A41.9 SEPSIS WITHOUT ACUTE ORGAN DYSFUNCTION, DUE TO UNSPECIFIED ORGANISM (HCC): ICD-10-CM

## 2023-12-04 DIAGNOSIS — E01.0 THYROMEGALY: ICD-10-CM

## 2023-12-04 LAB
ALBUMIN SERPL BCP-MCNC: 4.4 G/DL (ref 3.2–4.9)
ALBUMIN/GLOB SERPL: 1.5 G/DL
ALP SERPL-CCNC: 103 U/L (ref 30–99)
ALT SERPL-CCNC: 24 U/L (ref 2–50)
ANION GAP SERPL CALC-SCNC: 11 MMOL/L (ref 7–16)
APPEARANCE UR: CLEAR
AST SERPL-CCNC: 31 U/L (ref 12–45)
BASOPHILS # BLD AUTO: 1.2 % (ref 0–1.8)
BASOPHILS # BLD: 0.09 K/UL (ref 0–0.12)
BILIRUB SERPL-MCNC: 0.2 MG/DL (ref 0.1–1.5)
BILIRUB UR QL STRIP.AUTO: NEGATIVE
BUN SERPL-MCNC: 9 MG/DL (ref 8–22)
CALCIUM ALBUM COR SERPL-MCNC: 8.5 MG/DL (ref 8.5–10.5)
CALCIUM SERPL-MCNC: 8.8 MG/DL (ref 8.5–10.5)
CHLORIDE SERPL-SCNC: 104 MMOL/L (ref 96–112)
CO2 SERPL-SCNC: 24 MMOL/L (ref 20–33)
COLOR UR: YELLOW
CREAT SERPL-MCNC: 0.48 MG/DL (ref 0.5–1.4)
CRP SERPL HS-MCNC: 0.73 MG/DL (ref 0–0.75)
EOSINOPHIL # BLD AUTO: 0 K/UL (ref 0–0.51)
EOSINOPHIL NFR BLD: 0 % (ref 0–6.9)
ERYTHROCYTE [DISTWIDTH] IN BLOOD BY AUTOMATED COUNT: 48.1 FL (ref 35.9–50)
EST. AVERAGE GLUCOSE BLD GHB EST-MCNC: 103 MG/DL
FLUAV RNA SPEC QL NAA+PROBE: NEGATIVE
FLUBV RNA SPEC QL NAA+PROBE: NEGATIVE
GFR SERPLBLD CREATININE-BSD FMLA CKD-EPI: 116 ML/MIN/1.73 M 2
GLOBULIN SER CALC-MCNC: 3 G/DL (ref 1.9–3.5)
GLUCOSE SERPL-MCNC: 105 MG/DL (ref 65–99)
GLUCOSE UR STRIP.AUTO-MCNC: NEGATIVE MG/DL
HBA1C MFR BLD: 5.2 % (ref 4–5.6)
HCT VFR BLD AUTO: 39.7 % (ref 37–47)
HGB BLD-MCNC: 13.4 G/DL (ref 12–16)
IMM GRANULOCYTES # BLD AUTO: 0.05 K/UL (ref 0–0.11)
IMM GRANULOCYTES NFR BLD AUTO: 0.7 % (ref 0–0.9)
KETONES UR STRIP.AUTO-MCNC: 15 MG/DL
LACTATE SERPL-SCNC: 1.3 MMOL/L (ref 0.5–2)
LEUKOCYTE ESTERASE UR QL STRIP.AUTO: NEGATIVE
LYMPHOCYTES # BLD AUTO: 0.57 K/UL (ref 1–4.8)
LYMPHOCYTES NFR BLD: 7.7 % (ref 22–41)
MCH RBC QN AUTO: 31 PG (ref 27–33)
MCHC RBC AUTO-ENTMCNC: 33.8 G/DL (ref 32.2–35.5)
MCV RBC AUTO: 91.9 FL (ref 81.4–97.8)
MICRO URNS: ABNORMAL
MONOCYTES # BLD AUTO: 0.95 K/UL (ref 0–0.85)
MONOCYTES NFR BLD AUTO: 12.9 % (ref 0–13.4)
NEUTROPHILS # BLD AUTO: 5.7 K/UL (ref 1.82–7.42)
NEUTROPHILS NFR BLD: 77.5 % (ref 44–72)
NITRITE UR QL STRIP.AUTO: NEGATIVE
NRBC # BLD AUTO: 0 K/UL
NRBC BLD-RTO: 0 /100 WBC (ref 0–0.2)
PH UR STRIP.AUTO: 8.5 [PH] (ref 5–8)
PLATELET # BLD AUTO: 320 K/UL (ref 164–446)
PMV BLD AUTO: 9.4 FL (ref 9–12.9)
POTASSIUM SERPL-SCNC: 4.3 MMOL/L (ref 3.6–5.5)
PROCALCITONIN SERPL-MCNC: 0.09 NG/ML
PROT SERPL-MCNC: 7.4 G/DL (ref 6–8.2)
PROT UR QL STRIP: NEGATIVE MG/DL
RBC # BLD AUTO: 4.32 M/UL (ref 4.2–5.4)
RBC UR QL AUTO: NEGATIVE
RSV RNA SPEC QL NAA+PROBE: NEGATIVE
SARS-COV-2 RNA RESP QL NAA+PROBE: DETECTED
SODIUM SERPL-SCNC: 139 MMOL/L (ref 135–145)
SP GR UR STRIP.AUTO: >=1.045
TSH SERPL DL<=0.005 MIU/L-ACNC: 0.56 UIU/ML (ref 0.38–5.33)
UROBILINOGEN UR STRIP.AUTO-MCNC: 0.2 MG/DL
WBC # BLD AUTO: 7.4 K/UL (ref 4.8–10.8)

## 2023-12-04 PROCEDURE — 700111 HCHG RX REV CODE 636 W/ 250 OVERRIDE (IP): Mod: JZ,UD | Performed by: EMERGENCY MEDICINE

## 2023-12-04 PROCEDURE — 700117 HCHG RX CONTRAST REV CODE 255: Mod: UD | Performed by: EMERGENCY MEDICINE

## 2023-12-04 PROCEDURE — 86140 C-REACTIVE PROTEIN: CPT

## 2023-12-04 PROCEDURE — 87040 BLOOD CULTURE FOR BACTERIA: CPT

## 2023-12-04 PROCEDURE — 99406 BEHAV CHNG SMOKING 3-10 MIN: CPT | Performed by: STUDENT IN AN ORGANIZED HEALTH CARE EDUCATION/TRAINING PROGRAM

## 2023-12-04 PROCEDURE — 36415 COLL VENOUS BLD VENIPUNCTURE: CPT

## 2023-12-04 PROCEDURE — 84145 PROCALCITONIN (PCT): CPT

## 2023-12-04 PROCEDURE — 99222 1ST HOSP IP/OBS MODERATE 55: CPT | Mod: 25 | Performed by: STUDENT IN AN ORGANIZED HEALTH CARE EDUCATION/TRAINING PROGRAM

## 2023-12-04 PROCEDURE — 83036 HEMOGLOBIN GLYCOSYLATED A1C: CPT

## 2023-12-04 PROCEDURE — 009U3ZX DRAINAGE OF SPINAL CANAL, PERCUTANEOUS APPROACH, DIAGNOSTIC: ICD-10-PCS | Performed by: INTERNAL MEDICINE

## 2023-12-04 PROCEDURE — 770020 HCHG ROOM/CARE - TELE (206)

## 2023-12-04 PROCEDURE — 99406 BEHAV CHNG SMOKING 3-10 MIN: CPT

## 2023-12-04 PROCEDURE — 83605 ASSAY OF LACTIC ACID: CPT

## 2023-12-04 PROCEDURE — 93005 ELECTROCARDIOGRAM TRACING: CPT | Performed by: EMERGENCY MEDICINE

## 2023-12-04 PROCEDURE — 87086 URINE CULTURE/COLONY COUNT: CPT

## 2023-12-04 PROCEDURE — 99285 EMERGENCY DEPT VISIT HI MDM: CPT

## 2023-12-04 PROCEDURE — 70498 CT ANGIOGRAPHY NECK: CPT

## 2023-12-04 PROCEDURE — 80053 COMPREHEN METABOLIC PANEL: CPT

## 2023-12-04 PROCEDURE — 96365 THER/PROPH/DIAG IV INF INIT: CPT

## 2023-12-04 PROCEDURE — 70496 CT ANGIOGRAPHY HEAD: CPT

## 2023-12-04 PROCEDURE — 81003 URINALYSIS AUTO W/O SCOPE: CPT

## 2023-12-04 PROCEDURE — 700105 HCHG RX REV CODE 258: Mod: UD | Performed by: EMERGENCY MEDICINE

## 2023-12-04 PROCEDURE — 85025 COMPLETE CBC W/AUTO DIFF WBC: CPT

## 2023-12-04 PROCEDURE — 71045 X-RAY EXAM CHEST 1 VIEW: CPT

## 2023-12-04 PROCEDURE — 0241U HCHG SARS-COV-2 COVID-19 NFCT DS RESP RNA 4 TRGT ED POC: CPT

## 2023-12-04 PROCEDURE — 96367 TX/PROPH/DG ADDL SEQ IV INF: CPT

## 2023-12-04 PROCEDURE — 84443 ASSAY THYROID STIM HORMONE: CPT

## 2023-12-04 PROCEDURE — C9803 HOPD COVID-19 SPEC COLLECT: HCPCS

## 2023-12-04 RX ORDER — HYDRALAZINE HYDROCHLORIDE 20 MG/ML
10 INJECTION INTRAMUSCULAR; INTRAVENOUS
Status: DISCONTINUED | OUTPATIENT
Start: 2023-12-04 | End: 2023-12-09 | Stop reason: HOSPADM

## 2023-12-04 RX ORDER — LORAZEPAM 1 MG/1
1 TABLET ORAL
Status: DISCONTINUED | OUTPATIENT
Start: 2023-12-04 | End: 2023-12-09 | Stop reason: HOSPADM

## 2023-12-04 RX ORDER — ASPIRIN 81 MG/1
81 TABLET ORAL DAILY
Status: DISCONTINUED | OUTPATIENT
Start: 2023-12-05 | End: 2023-12-06

## 2023-12-04 RX ORDER — SODIUM CHLORIDE, SODIUM LACTATE, POTASSIUM CHLORIDE, AND CALCIUM CHLORIDE .6; .31; .03; .02 G/100ML; G/100ML; G/100ML; G/100ML
500 INJECTION, SOLUTION INTRAVENOUS
Status: DISCONTINUED | OUTPATIENT
Start: 2023-12-04 | End: 2023-12-09 | Stop reason: HOSPADM

## 2023-12-04 RX ORDER — ATORVASTATIN CALCIUM 40 MG/1
40 TABLET, FILM COATED ORAL EVERY EVENING
Status: DISCONTINUED | OUTPATIENT
Start: 2023-12-05 | End: 2023-12-05

## 2023-12-04 RX ORDER — LABETALOL HYDROCHLORIDE 5 MG/ML
10 INJECTION, SOLUTION INTRAVENOUS
Status: DISCONTINUED | OUTPATIENT
Start: 2023-12-04 | End: 2023-12-09 | Stop reason: HOSPADM

## 2023-12-04 RX ORDER — ACETAMINOPHEN 325 MG/1
650 TABLET ORAL EVERY 6 HOURS PRN
Status: DISCONTINUED | OUTPATIENT
Start: 2023-12-04 | End: 2023-12-09 | Stop reason: HOSPADM

## 2023-12-04 RX ORDER — BISACODYL 10 MG
10 SUPPOSITORY, RECTAL RECTAL
Status: DISCONTINUED | OUTPATIENT
Start: 2023-12-04 | End: 2023-12-09 | Stop reason: HOSPADM

## 2023-12-04 RX ORDER — DEXAMETHASONE 6 MG/1
6 TABLET ORAL DAILY
Status: DISCONTINUED | OUTPATIENT
Start: 2023-12-05 | End: 2023-12-06

## 2023-12-04 RX ORDER — SODIUM CHLORIDE 9 MG/ML
500 INJECTION, SOLUTION INTRAVENOUS
Status: DISCONTINUED | OUTPATIENT
Start: 2023-12-04 | End: 2023-12-09 | Stop reason: HOSPADM

## 2023-12-04 RX ORDER — POLYETHYLENE GLYCOL 3350 17 G/17G
1 POWDER, FOR SOLUTION ORAL
Status: DISCONTINUED | OUTPATIENT
Start: 2023-12-04 | End: 2023-12-09 | Stop reason: HOSPADM

## 2023-12-04 RX ORDER — SODIUM CHLORIDE 9 MG/ML
1000 INJECTION, SOLUTION INTRAVENOUS ONCE
Status: COMPLETED | OUTPATIENT
Start: 2023-12-04 | End: 2023-12-04

## 2023-12-04 RX ORDER — AZITHROMYCIN 500 MG/1
500 INJECTION, POWDER, LYOPHILIZED, FOR SOLUTION INTRAVENOUS ONCE
Status: COMPLETED | OUTPATIENT
Start: 2023-12-04 | End: 2023-12-04

## 2023-12-04 RX ORDER — AMOXICILLIN 250 MG
2 CAPSULE ORAL 2 TIMES DAILY
Status: DISCONTINUED | OUTPATIENT
Start: 2023-12-04 | End: 2023-12-09 | Stop reason: HOSPADM

## 2023-12-04 RX ADMIN — AZITHROMYCIN 500 MG: 500 INJECTION, POWDER, LYOPHILIZED, FOR SOLUTION INTRAVENOUS at 22:30

## 2023-12-04 RX ADMIN — SODIUM CHLORIDE 1000 ML: 9 INJECTION, SOLUTION INTRAVENOUS at 20:45

## 2023-12-04 RX ADMIN — IOHEXOL 60 ML: 350 INJECTION, SOLUTION INTRAVENOUS at 21:57

## 2023-12-04 RX ADMIN — PIPERACILLIN AND TAZOBACTAM 4.5 G: 4; .5 INJECTION, POWDER, FOR SOLUTION INTRAVENOUS at 21:05

## 2023-12-04 ASSESSMENT — PAIN DESCRIPTION - PAIN TYPE
TYPE: ACUTE PAIN
TYPE: ACUTE PAIN

## 2023-12-04 ASSESSMENT — FIBROSIS 4 INDEX: FIB4 SCORE: 0.77

## 2023-12-05 PROBLEM — R50.9 ACUTE FEBRILE ILLNESS: Status: ACTIVE | Noted: 2023-12-05

## 2023-12-05 PROBLEM — J12.82 PNEUMONIA DUE TO COVID-19 VIRUS: Status: ACTIVE | Noted: 2023-12-05

## 2023-12-05 PROBLEM — U07.1 PNEUMONIA DUE TO COVID-19 VIRUS: Status: ACTIVE | Noted: 2023-12-05

## 2023-12-05 PROBLEM — Z72.0 TOBACCO ABUSE: Status: ACTIVE | Noted: 2023-12-05

## 2023-12-05 LAB
ALBUMIN SERPL BCP-MCNC: 3.5 G/DL (ref 3.2–4.9)
ALBUMIN/GLOB SERPL: 1.3 G/DL
ALP SERPL-CCNC: 92 U/L (ref 30–99)
ALT SERPL-CCNC: 22 U/L (ref 2–50)
AMPHET UR QL SCN: NEGATIVE
ANION GAP SERPL CALC-SCNC: 12 MMOL/L (ref 7–16)
AST SERPL-CCNC: 32 U/L (ref 12–45)
BARBITURATES UR QL SCN: NEGATIVE
BASOPHILS # BLD AUTO: 1.1 % (ref 0–1.8)
BASOPHILS # BLD: 0.06 K/UL (ref 0–0.12)
BENZODIAZ UR QL SCN: NEGATIVE
BILIRUB SERPL-MCNC: 0.2 MG/DL (ref 0.1–1.5)
BUN SERPL-MCNC: 8 MG/DL (ref 8–22)
BZE UR QL SCN: NEGATIVE
CALCIUM ALBUM COR SERPL-MCNC: 8.3 MG/DL (ref 8.5–10.5)
CALCIUM SERPL-MCNC: 7.9 MG/DL (ref 8.5–10.5)
CANNABINOIDS UR QL SCN: NEGATIVE
CHLORIDE SERPL-SCNC: 105 MMOL/L (ref 96–112)
CHOLEST SERPL-MCNC: 157 MG/DL (ref 100–199)
CO2 SERPL-SCNC: 19 MMOL/L (ref 20–33)
CREAT SERPL-MCNC: 0.5 MG/DL (ref 0.5–1.4)
EKG IMPRESSION: NORMAL
EOSINOPHIL # BLD AUTO: 0.01 K/UL (ref 0–0.51)
EOSINOPHIL NFR BLD: 0.2 % (ref 0–6.9)
ERYTHROCYTE [DISTWIDTH] IN BLOOD BY AUTOMATED COUNT: 48.7 FL (ref 35.9–50)
FENTANYL UR QL: NEGATIVE
GFR SERPLBLD CREATININE-BSD FMLA CKD-EPI: 115 ML/MIN/1.73 M 2
GLOBULIN SER CALC-MCNC: 2.7 G/DL (ref 1.9–3.5)
GLUCOSE BLD STRIP.AUTO-MCNC: 134 MG/DL (ref 65–99)
GLUCOSE SERPL-MCNC: 105 MG/DL (ref 65–99)
HCT VFR BLD AUTO: 35 % (ref 37–47)
HDLC SERPL-MCNC: 52 MG/DL
HGB BLD-MCNC: 11.4 G/DL (ref 12–16)
IMM GRANULOCYTES # BLD AUTO: 0.03 K/UL (ref 0–0.11)
IMM GRANULOCYTES NFR BLD AUTO: 0.6 % (ref 0–0.9)
INR PPP: 1.18 (ref 0.87–1.13)
LDLC SERPL CALC-MCNC: 96 MG/DL
LYMPHOCYTES # BLD AUTO: 0.6 K/UL (ref 1–4.8)
LYMPHOCYTES NFR BLD: 11.2 % (ref 22–41)
MAGNESIUM SERPL-MCNC: 1.7 MG/DL (ref 1.5–2.5)
MCH RBC QN AUTO: 30.2 PG (ref 27–33)
MCHC RBC AUTO-ENTMCNC: 32.6 G/DL (ref 32.2–35.5)
MCV RBC AUTO: 92.8 FL (ref 81.4–97.8)
METHADONE UR QL SCN: NEGATIVE
MONOCYTES # BLD AUTO: 0.67 K/UL (ref 0–0.85)
MONOCYTES NFR BLD AUTO: 12.5 % (ref 0–13.4)
NEUTROPHILS # BLD AUTO: 3.99 K/UL (ref 1.82–7.42)
NEUTROPHILS NFR BLD: 74.4 % (ref 44–72)
NRBC # BLD AUTO: 0 K/UL
NRBC BLD-RTO: 0 /100 WBC (ref 0–0.2)
OPIATES UR QL SCN: NEGATIVE
OXYCODONE UR QL SCN: NEGATIVE
PCP UR QL SCN: NEGATIVE
PHOSPHATE SERPL-MCNC: 2.3 MG/DL (ref 2.5–4.5)
PLATELET # BLD AUTO: 275 K/UL (ref 164–446)
PMV BLD AUTO: 9.5 FL (ref 9–12.9)
POTASSIUM SERPL-SCNC: 3.9 MMOL/L (ref 3.6–5.5)
PROPOXYPH UR QL SCN: NEGATIVE
PROT SERPL-MCNC: 6.2 G/DL (ref 6–8.2)
PROTHROMBIN TIME: 15.1 SEC (ref 12–14.6)
RBC # BLD AUTO: 3.77 M/UL (ref 4.2–5.4)
SCCMEC + MECA PNL NOSE NAA+PROBE: NEGATIVE
SCCMEC + MECA PNL NOSE NAA+PROBE: NEGATIVE
SODIUM SERPL-SCNC: 136 MMOL/L (ref 135–145)
TRIGL SERPL-MCNC: 45 MG/DL (ref 0–149)
WBC # BLD AUTO: 5.4 K/UL (ref 4.8–10.8)

## 2023-12-05 PROCEDURE — 96367 TX/PROPH/DG ADDL SEQ IV INF: CPT

## 2023-12-05 PROCEDURE — 700105 HCHG RX REV CODE 258: Performed by: EMERGENCY MEDICINE

## 2023-12-05 PROCEDURE — 87641 MR-STAPH DNA AMP PROBE: CPT

## 2023-12-05 PROCEDURE — 84100 ASSAY OF PHOSPHORUS: CPT

## 2023-12-05 PROCEDURE — 82962 GLUCOSE BLOOD TEST: CPT

## 2023-12-05 PROCEDURE — 92610 EVALUATE SWALLOWING FUNCTION: CPT

## 2023-12-05 PROCEDURE — 87640 STAPH A DNA AMP PROBE: CPT

## 2023-12-05 PROCEDURE — 700111 HCHG RX REV CODE 636 W/ 250 OVERRIDE (IP): Performed by: EMERGENCY MEDICINE

## 2023-12-05 PROCEDURE — 80053 COMPREHEN METABOLIC PANEL: CPT

## 2023-12-05 PROCEDURE — 99233 SBSQ HOSP IP/OBS HIGH 50: CPT | Performed by: NURSE PRACTITIONER

## 2023-12-05 PROCEDURE — 85610 PROTHROMBIN TIME: CPT

## 2023-12-05 PROCEDURE — 770020 HCHG ROOM/CARE - TELE (206)

## 2023-12-05 PROCEDURE — 700105 HCHG RX REV CODE 258: Performed by: STUDENT IN AN ORGANIZED HEALTH CARE EDUCATION/TRAINING PROGRAM

## 2023-12-05 PROCEDURE — 80307 DRUG TEST PRSMV CHEM ANLYZR: CPT

## 2023-12-05 PROCEDURE — 700102 HCHG RX REV CODE 250 W/ 637 OVERRIDE(OP): Performed by: STUDENT IN AN ORGANIZED HEALTH CARE EDUCATION/TRAINING PROGRAM

## 2023-12-05 PROCEDURE — 96372 THER/PROPH/DIAG INJ SC/IM: CPT

## 2023-12-05 PROCEDURE — 700111 HCHG RX REV CODE 636 W/ 250 OVERRIDE (IP): Mod: JZ | Performed by: STUDENT IN AN ORGANIZED HEALTH CARE EDUCATION/TRAINING PROGRAM

## 2023-12-05 PROCEDURE — 85025 COMPLETE CBC W/AUTO DIFF WBC: CPT

## 2023-12-05 PROCEDURE — 83735 ASSAY OF MAGNESIUM: CPT

## 2023-12-05 PROCEDURE — A9270 NON-COVERED ITEM OR SERVICE: HCPCS | Performed by: STUDENT IN AN ORGANIZED HEALTH CARE EDUCATION/TRAINING PROGRAM

## 2023-12-05 PROCEDURE — 80061 LIPID PANEL: CPT

## 2023-12-05 RX ORDER — ATORVASTATIN CALCIUM 80 MG/1
80 TABLET, FILM COATED ORAL EVERY EVENING
Status: DISCONTINUED | OUTPATIENT
Start: 2023-12-05 | End: 2023-12-06

## 2023-12-05 RX ORDER — NICOTINE 21 MG/24HR
21 PATCH, TRANSDERMAL 24 HOURS TRANSDERMAL
Status: DISCONTINUED | OUTPATIENT
Start: 2023-12-05 | End: 2023-12-09 | Stop reason: HOSPADM

## 2023-12-05 RX ORDER — SODIUM CHLORIDE 9 MG/ML
INJECTION, SOLUTION INTRAVENOUS CONTINUOUS
Status: DISCONTINUED | OUTPATIENT
Start: 2023-12-05 | End: 2023-12-05

## 2023-12-05 RX ORDER — ENOXAPARIN SODIUM 100 MG/ML
40 INJECTION SUBCUTANEOUS DAILY
Status: DISCONTINUED | OUTPATIENT
Start: 2023-12-05 | End: 2023-12-09 | Stop reason: HOSPADM

## 2023-12-05 RX ADMIN — ENOXAPARIN SODIUM 40 MG: 100 INJECTION SUBCUTANEOUS at 17:48

## 2023-12-05 RX ADMIN — ASPIRIN 81 MG: 81 TABLET, COATED ORAL at 09:56

## 2023-12-05 RX ADMIN — DOCUSATE SODIUM 50 MG AND SENNOSIDES 8.6 MG 2 TABLET: 8.6; 5 TABLET, FILM COATED ORAL at 17:48

## 2023-12-05 RX ADMIN — ATORVASTATIN CALCIUM 80 MG: 80 TABLET, FILM COATED ORAL at 17:47

## 2023-12-05 RX ADMIN — SODIUM CHLORIDE 1000 ML: 9 INJECTION, SOLUTION INTRAVENOUS at 01:43

## 2023-12-05 RX ADMIN — ENOXAPARIN SODIUM 40 MG: 100 INJECTION SUBCUTANEOUS at 04:40

## 2023-12-05 RX ADMIN — NICOTINE TRANSDERMAL SYSTEM 21 MG: 21 PATCH, EXTENDED RELEASE TRANSDERMAL at 08:12

## 2023-12-05 RX ADMIN — SODIUM CHLORIDE: 9 INJECTION, SOLUTION INTRAVENOUS at 10:34

## 2023-12-05 RX ADMIN — DEXAMETHASONE 6 MG: 4 TABLET ORAL at 09:56

## 2023-12-05 RX ADMIN — THIAMINE HYDROCHLORIDE 200 MG: 100 INJECTION, SOLUTION INTRAMUSCULAR; INTRAVENOUS at 00:31

## 2023-12-05 ASSESSMENT — ENCOUNTER SYMPTOMS
VOMITING: 0
WEIGHT LOSS: 1
EYES NEGATIVE: 1
SENSORY CHANGE: 0
COUGH: 1
MYALGIAS: 0
PALPITATIONS: 0
SHORTNESS OF BREATH: 1
FEVER: 1
CHILLS: 0
NAUSEA: 1
WEAKNESS: 1
MYALGIAS: 1
FOCAL WEAKNESS: 1
TREMORS: 0
SPUTUM PRODUCTION: 1
SPEECH CHANGE: 0
HEARTBURN: 0
BLURRED VISION: 0
HEADACHES: 0
DIZZINESS: 1
DEPRESSION: 0
DOUBLE VISION: 0
ABDOMINAL PAIN: 0
GASTROINTESTINAL NEGATIVE: 1
FEVER: 0
BRUISES/BLEEDS EASILY: 0
PSYCHIATRIC NEGATIVE: 1
WHEEZING: 1

## 2023-12-05 ASSESSMENT — LIFESTYLE VARIABLES: SUBSTANCE_ABUSE: 0

## 2023-12-05 ASSESSMENT — PAIN DESCRIPTION - PAIN TYPE: TYPE: ACUTE PAIN

## 2023-12-05 NOTE — THERAPY
Speech Language Pathology   Clinical Swallow Evaluation     Patient Name: Rama Bhatt  AGE:  48 y.o., SEX:  female  Medical Record #: [id number]  Date of Service: 12/5/2023    History of Present Illness  Pt is a 48 y.o. F w/ hx of hyperlipidemia, tobacco abuse who presented 12/4/2023 with evaluation for URI symptoms, right-sided weakness.     CMHx: R sided weakness, PNA d/t COVID-19, acute febrile illness, mixed hyperlipidemia, tobacco abuse     No hx of ST per EMR     DX Chest 12/4:   1.  Hazy left lower lobe infiltrate.    CT-CTA Head 12/4:   1.  No large vessel occlusion or aneurysm identified.  2.  Periventricular and subcortical white matter low-attenuation changes, nonspecific but may represent changes of small vessel ischemia.  3.  Mild cerebral atrophy.    MRI pending     General Information:  Vitals  O2 Delivery Device: None - Room Air  Level of Consciousness: Alert, Awake  Patient Behaviors: Fatigue  Orientation: Oriented x 4  Follows Directives: Yes    Prior Living Situation & Level of Function:  Prior Services: None  Lives with - Patient's Self Care Capacity: Adult Children  Communication: WFL  Swallowing: [facility]     Oral Mechanism Evaluation:  Dentition: Fair, Natural dentition   Facial Symmetry: Equal  Facial Sensation: Equal     Labial Observations: WFL   Lingual Observations: Midline  Motor Speech: [facility]       Laryngeal Function:  Secretion Management: Adequate  Voice Quality: Hoarse  Cough: Perceptually WNL    Subjective  Pt cleared by RN for clinical bedside swallow evaluation. Pt was received asleep, however, aroused with min verbal cues. Pt was agreeable to SLP tasks. Pt denied hx of dysphagia and acid reflux/GERD.    Assessment  Current Method of Nutrition: Oral diet (Puree solids/ Slightly thick liquids)  Positioning: [place]'s (60-90 degrees)  Bolus Administration: Patient  O2 Delivery Device: None - Room Air  Factor(s) Affecting Performance: None     Swallowing Trials:  Swallowing  Trials  Thin Liquid (TN0): WFL  Liquidised (LQ3): WFL  Soft & Bite Sized (SB6): WFL  Regular (RG7): WFL    Comments:  Pt was sitting upright midline in [place]’s prior to PO trials. Pt managed trials of TN0, LQ3, SB6, and R7 were completed. Pt demonstrated appropriate bolus acceptance/containment, stripping, and A/P transport of the bolus inferred to be WNL evidenced via lack of oral residue upon visual inspection. Mastication was coordinated and timely. HLE was palpated and present; single swallow appreciated per bolus. Clinical indicators of aspiration not appreciated. Pt denied other clinical indictors of dysphagia including globus/odynophagia. No signs of esophageal dysphagia appreciated. Pt self-feeing with appropriate rate and volume.     SLP provided education on general s/s of aspiration and aspiration precautions. Pt verbalized understanding. All questions addressed.     Clinical Impressions  Pt presenting with a grossly functional oropharyngeal swallow on this date. Pt is at increased risk of impaired airway protection/swallow efficiency 2/2 potential CVA and generalized weakness/PNA 2/2 COVID+; However, no clinical indicators of aspiration appreciated on this date. Therefore, SLP recommending initiation of regular solid/thin liquid diet with adherence to the recommendations below. Should s/s of aspiration be appreciated on PO or decline in respiratory status, please hold PO and consult ST. ST indicated in the acute setting to monitor for diet tolerance and changes.     Recommendations  Diet Consistency: Regular solids/ Thin liquids  Instrumentation: None indicated at this time  Medication: Whole with liquid, As tolerated  Supervision: Distant supervision - check on patient 2-3 times per meal  Positioning: Fully upright and midline during oral intake, Meals sitting upright in a chair, as tolerated  Risk Management : Small bites/sips, Slow rate of intake, Physical mobility, as tolerated  Oral Care:  "BID  Cognitive linguistic communication evaluation pending MRI.      SLP Treatment Plan  Treatment Plan: Dysphagia Treatment, Patient/Family/Caregiver Training  SLP Frequency: 3x Per Week  Estimated Duration: Until Therapy Goals Met    Anticipated Discharge Needs  Discharge Recommendations: Other (TBD pending clinical progress)   Therapy Recommendations Upon DC: Dysphagia Training, Patient / Family / Caregiver Education      Patient / Family Goals  Patient / Family Goal #1: \"Maybe I am hungry.\"  Short Term Goals  Short Term Goal # 1: Pt will consume a regular solid/thin liquid diet without any overt s/s of aspiration or decline in respiratory status.    Wen Koehler, SLP   "

## 2023-12-05 NOTE — ASSESSMENT & PLAN NOTE
R/o CVA  Possibly sequela of COVID  Ongoing symptoms for at least the past 2 weeks  No LVO or dissection seen on CTA head and neck  Echo is normal  MRI brain shows extensive demyelinating plaques  Patients symptoms have improved, possible due to decadron use for COVID which has been stopped as patient is not hypoxic  Hold steroids for now as symptoms have resolved  Neurology consulted, appreciate their recommendations

## 2023-12-05 NOTE — ED NOTES
Med rec updated and complete. Allergies reviewed.  Confirmed   Name and date of birth.  Interviewed family at bedside.  Family reports that pt is very inconsistent about taking her medications.    Pt is unable to participate in an interview.        Home pharmacy  [facility] = [phone]

## 2023-12-05 NOTE — ED NOTES
Bedside report rec'd from JOSH Lundy.  Pt resting in [facility].  No distress noted.   Pt on RA, IVF infusing by pump.    Call light in reach.

## 2023-12-05 NOTE — ED NOTES
Bedside report received from off going RN/tech: Rosendo MORENO, assumed care of patient.  POC discussed with patient. Call light within reach, all needs addressed at this time.       Fall risk interventions in place: Move the patient closer to the nurse's station, Patient's personal possessions are with in their safe reach, Place socks on patient, Place fall risk sign on patient's door, Keep floor surfaces clean and dry, and Accompanied to restroom (all applicable per [place] Fall risk assessment)   Continuous monitoring: Cardiac Leads, Pulse Ox, or Blood Pressure  IVF/IV medications: Infusion per MAR (List Med(s)) NS 100ml/hr  Oxygen: Room Air  Bedside sitter: Not Applicable   Isolation: Isolation precautions for droplet/covid (Isolation order)

## 2023-12-05 NOTE — ED NOTES
Neuro assessment performed. Pt unable to cough and provide respirate. Pt given warm blanket and medicated per MAR.

## 2023-12-05 NOTE — ED NOTES
Bedside report received from off going RN/ Mary, assumed care of patient.  Pt sleeping. Active chest rise and fall noted. Call light within reach, all needs addressed at this time.       Fall risk interventions in place: Patient's personal possessions are with in their safe reach, Place socks on patient, Place fall risk sign on patient's door, and Keep floor surfaces clean and dry (all applicable per [place] Fall risk assessment)   Continuous monitoring: Cardiac Leads, Pulse Ox, or Blood Pressure  IVF/IV medications: Infusion per MAR (List Med(s)) NS, RT AC. Rate change 125mL/ hr.   Oxygen: Room Air  Bedside sitter: Not Applicable   Isolation: Isolation precautions for COVID (Isolation order)

## 2023-12-05 NOTE — ED TRIAGE NOTES
".  Chief Complaint   Patient presents with    Possible Stroke     Stroke like symptoms right side weakness x 1 month       48 yr patient BIB [facility] from home to [facility]  for above complaint. PER EMS patient has had these symptoms of right sided weakness for 1 month. Patient Scored a 3 on the sepsis scale.     Pt placed in RED 6 IV started, labs drawn and sent to lab, placed on monitor.     Patient and staff wearing appropriate PPE    BP (!) 146/82   Pulse (!) 103   Temp (!) 38.3 °C (101 °F) (Temporal)   Resp 18   Ht 1.753 m (5' 9\")   Wt 81.6 kg (180 lb)   SpO2 96%   BMI 26.58 kg/m²     "

## 2023-12-05 NOTE — ASSESSMENT & PLAN NOTE
Cessation counseling provided: 5 minutes  Offered nicotine patch, nicotine gum, Chantix as alternative.  Provided patient with standard tobacco cessation information per protocol

## 2023-12-05 NOTE — DISCHARGE PLANNING
[facility] Transitional Care Coordination    Referral from: Dr Garcia  Insurance Provider on Facesheet: [place] Medicaid  Potential Rehab Diagnosis: R/o stroke    Chart review indicates patient may have on going medical management and may have therapy needs to possibly meet inpatient rehab facility criteria with the goal of returning to community.    D/C support: TBD     Physiatry consultation pended per protocol.     R/o stroke, Covid+ 12/4 pending workup and therapy evals as appropriate. Physiatry consult pended, TCC will follow.     Thank you for the referral.

## 2023-12-05 NOTE — H&P
Hospital Medicine History & Physical Note    Date of Service  12/4/2023    Primary Care Physician  IBAN Clemente    Consultants  None    Code Status  Full Code    Chief Complaint  Chief Complaint   Patient presents with    Possible Stroke     Stroke like symptoms right side weakness x 1 month       History of Presenting Illness  Rama Bhatt is a 48 y.o. female with history of hyperlipidemia, tobacco abuse who presented 12/4/2023 with evaluation for URI symptoms, right-sided weakness.  Patient reported not feeling well for the past 2 weeks to 4 weeks, endorsed fever, chills, cough.  She also noted to have weakness of right side which has been ongoing for the past 2 weeks.  As symptoms not resolving, patient was brought to ER for evaluation.  In ER, initially thought to be sepsis due to fever 101 Fahrenheit, tachycardic --patient received empiric Zosyn and azithromycin by ERP.  CTA head and neck did not show LVO or dissection.  Admission requested by ERP.  Admitted to medicine service for further evaluation and treatment.    ED course: Zosyn 4.5 g, azithromycin 500 mg IV, thiamine 200 mg IV, 1 L NS bolus    I discussed the plan of care with patient, family, bedside RN, and pharmacy.    Review of Systems  Review of Systems   Constitutional:  Positive for fever, malaise/fatigue and weight loss.   HENT:  Negative for hearing loss and tinnitus.    Eyes:  Negative for blurred vision and double vision.   Respiratory:  Positive for cough, sputum production and shortness of breath.    Cardiovascular:  Negative for chest pain and palpitations.   Gastrointestinal:  Positive for nausea. Negative for abdominal pain, heartburn and vomiting.   Genitourinary:  Negative for dysuria and urgency.   Musculoskeletal:  Negative for joint pain and myalgias.   Skin:  Negative for itching and rash.   Neurological:  Positive for dizziness, focal weakness (right) and weakness. Negative for tremors, sensory change, speech  change and headaches.   Endo/Heme/Allergies:  Negative for environmental allergies. Does not bruise/bleed easily.   Psychiatric/Behavioral:  Negative for depression and substance abuse.    All other systems reviewed and are negative.      Past Medical History   has a past medical history of Bell's palsy (2004).    Surgical History   has a past surgical history that includes gyn surgery.     Family History  family history includes Diabetes in her father and mother.   Family history reviewed with patient. There is family history that is pertinent to the chief complaint.   FH of CVA    Social History   reports that she has been smoking. She has a 3.5 pack-year smoking history. She has never used smokeless tobacco. She reports that she does not currently use alcohol. She reports current drug use. Drugs: Marijuana and Inhaled.    Allergies  Allergies   Allergen Reactions    Nkda [No Known Drug Allergy]        Medications  Prior to Admission Medications   Prescriptions Last Dose Informant Patient Reported? Taking?   loratadine (CLARITIN) 10 MG Tab   No No   Sig: Take 1 Tablet by mouth every day.   simvastatin (ZOCOR) 40 MG Tab   No No   Sig: TAKE 1 TABLET BY MOUTH EVERY DAY IN THE EVENING   vitamin D2, Ergocalciferol, (DRISDOL) 1.25 MG (81060 UT) Cap capsule   No No   Sig: TAKE 1 CAPSULE BY MOUTH ONE TIME PER WEEK      Facility-Administered Medications: None       Physical Exam  Temp:  [37.7 °C (99.8 °F)-38.3 °C (101 °F)] 37.7 °C (99.8 °F)  Pulse:  [] 86  Resp:  [15-23] 16  BP: ()/(62-82) 119/64  SpO2:  [93 %-97 %] 93 %  Blood Pressure: 129/62   Temperature: (!) 38.3 °C (101 °F)   Pulse: 99   Respiration: (!) 21   Pulse Oximetry: 93 %       Physical Exam  Vitals and nursing note reviewed.   Constitutional:       General: She is not in acute distress.  HENT:      Head: Normocephalic and atraumatic.      Nose: Nose normal.      Mouth/Throat:      Mouth: Mucous membranes are dry.      Pharynx: Oropharynx is  "clear.   Eyes:      General: No scleral icterus.     Extraocular Movements: Extraocular movements intact.   Cardiovascular:      Rate and Rhythm: Normal rate and regular rhythm.      Pulses: Normal pulses.      Heart sounds:      No friction rub.   Pulmonary:      Effort: No respiratory distress.      Breath sounds: No wheezing or rales.   Chest:      Chest wall: No tenderness.   Abdominal:      General: There is no distension.      Tenderness: There is no abdominal tenderness. There is no guarding or rebound.   Musculoskeletal:         General: No tenderness.      Cervical back: Neck supple. No tenderness.      Right lower leg: No edema.      Left lower leg: No edema.   Skin:     General: Skin is warm and dry.      Capillary Refill: Capillary refill takes less than 2 seconds.   Neurological:      General: No focal deficit present.      Mental Status: She is alert and oriented to person, place, and time.      Sensory: No sensory deficit.      Motor: Weakness (mild, Rright) present.      Comments: No facial droop  No pronator drift  No dysarthria   Psychiatric:         Mood and Affect: Mood normal.         Laboratory:  Recent Labs     12/04/23 2031 12/05/23 0013   WBC 7.4 5.4   RBC 4.32 3.77*   HEMOGLOBIN 13.4 11.4*   HEMATOCRIT 39.7 35.0*   MCV 91.9 92.8   MCH 31.0 30.2   MCHC 33.8 32.6   RDW 48.1 48.7   PLATELETCT 320 275   MPV 9.4 9.5     Recent Labs     12/04/23 2031 12/05/23 0013   SODIUM 139 136   POTASSIUM 4.3 3.9   CHLORIDE 104 105   CO2 24 19*   GLUCOSE 105* 105*   BUN 9 8   CREATININE 0.48* 0.50   CALCIUM 8.8 7.9*     Recent Labs     12/04/23 2031 12/05/23  0013   ALTSGPT 24 22   ASTSGOT 31 32   ALKPHOSPHAT 103* 92   TBILIRUBIN 0.2 0.2   GLUCOSE 105* 105*     Recent Labs     12/05/23  0013   INR 1.18*     No results for input(s): \"NTPROBNP\" in the last 72 hours.  Recent Labs     12/05/23 0013   TRIGLYCERIDE 45   HDL 52   LDL 96     No results for input(s): \"TROPONINT\" in the last 72 " hours.    Imaging:  CT-CTA NECK WITH & W/O-POST PROCESSING   Final Result         1.  CT angiogram of the neck within normal limits.   2.  Diffuse enlargement of thyroid with multiple nodules, recommend follow-up thyroid sonography due to nodule size and diffusely enlarged thyroid.         CT-CTA HEAD WITH & W/O-POST PROCESS   Final Result         1.  No large vessel occlusion or aneurysm identified.   2.  Periventricular and subcortical white matter low-attenuation changes, nonspecific but may represent changes of small vessel ischemia.   3.  Mild cerebral atrophy.      DX-CHEST-PORTABLE (1 VIEW)   Final Result         1.  Hazy left lower lobe infiltrate.      MR-BRAIN-W/O    (Results Pending)   EC-ECHOCARDIOGRAM COMPLETE W/O CONT    (Results Pending)       X-Ray:  I have personally reviewed the images and compared with prior images.    Assessment/Plan:  Justification for Admission Status  I anticipate this patient will require at least 2 midnights hospitalization, therefore appropriate for inpatient status.      * Right sided weakness- (present on admission)  Assessment & Plan  R/o CVA  Possibly sequela of COVID  Ongoing symptoms for at least the past 2 weeks  No LVO or dissection seen on CTA head and neck  Aspirin/statin  Check echo, MRI brain    Pneumonia due to COVID-19 virus  Assessment & Plan  Positive COVID in ER  Denies prior vaccination  Given Zosyn and azithromycin in ER, defer further antibiotic at this time given procalcitonin WNL  Intermittent hypoxia, minimal oxygen support  Decadron  Assess the need for remdesivir/baricitinib daily    Acute febrile illness  Assessment & Plan  Likely secondary to COVID    Mixed hyperlipidemia- (present on admission)  Assessment & Plan  Continue statin    Tobacco abuse  Assessment & Plan  Cessation counseling provided: 5 minutes  Offered nicotine patch, nicotine gum, Chantix as alternative.  Provided patient with standard tobacco cessation information per  protocol        VTE prophylaxis: enoxaparin ppx

## 2023-12-05 NOTE — ED NOTES
Checked on bed, connected to monitor,  with unlabored respirations. Vital signs monitored. Pt sleeping comfortably on bed.  No current needs identified. [place] in low position, side rail up for pt safety. Call light within reach.

## 2023-12-05 NOTE — HOSPITAL COURSE
Ms. Rama Bhatt is a 48 y.o. female with history of hyperlipidemia, tobacco abuse who presented 12/4/2023 with evaluation for URI symptoms, right-sided weakness.      Patient reported not feeling well for the past 2 weeks to 4 weeks, endorsed fever, chills, cough.  She also noted to have weakness of right side which has been ongoing for the past 2 weeks.  As symptoms not resolving, patient was brought to ER for evaluation.  In ER, initially thought to be sepsis due to fever 101 Fahrenheit, tachycardic --patient received empiric Zosyn and azithromycin by ERP.  CTA head and neck did not show LVO or dissection.  Admission requested by ERP.  Admitted to medicine service for further evaluation and treatment.    In ER, patient started on Zosyn 4.5 g, azithromycin 500 mg IV, thiamine 200 mg IV, 1 L NS bolus. Pending Echo and MRI Brain.

## 2023-12-05 NOTE — ED NOTES
Call to lab to run urine culture off of previously sent urine. Placed on hold. Will be able to run.

## 2023-12-05 NOTE — PROGRESS NOTES
[facility] Medicine Daily Progress Note    Date of Service  12/5/2023    Chief Complaint  Rama Bhatt is a 48 y.o. female admitted 12/4/2023 with URI and RIGHT sided weakness    Hospital Course  Ms. Rama Bhatt is a 48 y.o. female with history of hyperlipidemia, tobacco abuse who presented 12/4/2023 with evaluation for URI symptoms, right-sided weakness.      Patient reported not feeling well for the past 2 weeks to 4 weeks, endorsed fever, chills, cough.  She also noted to have weakness of right side which has been ongoing for the past 2 weeks.  As symptoms not resolving, patient was brought to ER for evaluation.  In ER, initially thought to be sepsis due to fever 101 Fahrenheit, tachycardic --patient received empiric Zosyn and azithromycin by ERP.  CTA head and neck did not show LVO or dissection.  Admission requested by ERP.  Admitted to medicine service for further evaluation and treatment.    In ER, patient started on Zosyn 4.5 g, azithromycin 500 mg IV, thiamine 200 mg IV, 1 L NS bolus. Pending Echo and MRI Brain.     Interval Problem Update  -Patient seen and examined.  Patient appears very lethargic, however, still arousable and following commands.  Discussed with patient pursuing MRI and echocardiogram due to noted right-sided weakness.  Patient still complains of some cough and fatigue.  -Plan of care: Continue to monitor patient and standard precaution due to COVID-19; obtain MRI and echocardiogram  -Disposition: Anticipated to stay 2-3 midnights for management of URI, and right-sided weakness to rule out TIA  -Lab work: Reviewed; expected  -VSS at this time    I have discussed this patient's plan of care and discharge plan at IDT rounds today with Case Management, Nursing, Nursing leadership, and other members of the IDT team.    Consultants/Specialty  NONE    Code Status  Full Code    Disposition  The patient is not medically cleared for discharge to home or a post-acute  facility.  Anticipate discharge to: home with close outpatient follow-up    I have placed the appropriate orders for post-discharge needs.    Review of Systems  Review of Systems   Constitutional:  Positive for malaise/fatigue. Negative for chills and fever.   HENT: Negative.     Eyes: Negative.    Respiratory:  Positive for cough, shortness of breath and wheezing.    Cardiovascular:  Positive for chest pain.   Gastrointestinal: Negative.    Genitourinary: Negative.    Musculoskeletal:  Positive for myalgias.   Skin: Negative.    Neurological:  Positive for weakness.   Endo/Heme/Allergies: Negative.    Psychiatric/Behavioral: Negative.          Physical Exam  Temp:  [37.4 °C (99.3 °F)-38.3 °C (101 °F)] 37.4 °C (99.3 °F)  Pulse:  [] 86  Resp:  [14-23] 20  BP: ()/(62-82) 125/81  SpO2:  [91 %-97 %] 94 %    Physical Exam  Vitals and nursing note reviewed.   HENT:      Head: Normocephalic.      Nose: Nose normal.      Mouth/Throat:      Mouth: Mucous membranes are moist.      Pharynx: Oropharynx is clear.   Eyes:      Pupils: Pupils are equal, round, and reactive to light.   Cardiovascular:      Rate and Rhythm: Normal rate and regular rhythm.      Pulses: Normal pulses.      Heart sounds: Normal heart sounds.   Pulmonary:      Effort: Pulmonary effort is normal.      Breath sounds: Normal breath sounds.   Abdominal:      General: Bowel sounds are normal.      Palpations: Abdomen is soft.   Musculoskeletal:         General: Tenderness present.      Cervical back: Normal range of motion and neck supple.   Skin:     General: Skin is dry.      Capillary Refill: Capillary refill takes 2 to 3 seconds.   Neurological:      Mental Status: She is alert. Mental status is at baseline.         Fluids    Intake/Output Summary (Last 24 hours) at 12/5/2023 1404  Last data filed at 12/5/2023 0115  Gross per 24 hour   Intake 1200 ml   Output --   Net 1200 ml       Laboratory  Recent Labs     12/04/23 2031 12/05/23  0013    WBC 7.4 5.4   RBC 4.32 3.77*   HEMOGLOBIN 13.4 11.4*   HEMATOCRIT 39.7 35.0*   MCV 91.9 92.8   MCH 31.0 30.2   MCHC 33.8 32.6   RDW 48.1 48.7   PLATELETCT 320 275   MPV 9.4 9.5     Recent Labs     12/04/23  2031 12/05/23  0013   SODIUM 139 136   POTASSIUM 4.3 3.9   CHLORIDE 104 105   CO2 24 19*   GLUCOSE 105* 105*   BUN 9 8   CREATININE 0.48* 0.50   CALCIUM 8.8 7.9*     Recent Labs     12/05/23  0013   INR 1.18*         Recent Labs     12/05/23  0013   TRIGLYCERIDE 45   HDL 52   LDL 96       Imaging  CT-CTA NECK WITH & W/O-POST PROCESSING   Final Result         1.  CT angiogram of the neck within normal limits.   2.  Diffuse enlargement of thyroid with multiple nodules, recommend follow-up thyroid sonography due to nodule size and diffusely enlarged thyroid.         CT-CTA HEAD WITH & W/O-POST PROCESS   Final Result         1.  No large vessel occlusion or aneurysm identified.   2.  Periventricular and subcortical white matter low-attenuation changes, nonspecific but may represent changes of small vessel ischemia.   3.  Mild cerebral atrophy.      DX-CHEST-PORTABLE (1 VIEW)   Final Result         1.  Hazy left lower lobe infiltrate.      MR-BRAIN-W/O    (Results Pending)   EC-ECHOCARDIOGRAM COMPLETE W/O CONT    (Results Pending)        Assessment/Plan  * Right sided weakness- (present on admission)  Assessment & Plan  R/o CVA  Possibly sequela of COVID  Ongoing symptoms for at least the past 2 weeks  No LVO or dissection seen on CTA head and neck  Aspirin/statin  Check echo, MRI brain    Tobacco abuse  Assessment & Plan  Cessation counseling provided: 5 minutes  Offered nicotine patch, nicotine gum, Chantix as alternative.  Provided patient with standard tobacco cessation information per protocol    Acute febrile illness  Assessment & Plan  Likely secondary to COVID    Pneumonia due to COVID-19 virus  Assessment & Plan  Positive COVID in ER  Denies prior vaccination  Given Zosyn and azithromycin in ER, defer  further antibiotic at this time given procalcitonin WNL  Intermittent hypoxia, minimal oxygen support  Decadron  Assess the need for remdesivir/baricitinib daily    Mixed hyperlipidemia- (present on admission)  Assessment & Plan  Continue statin         VTE prophylaxis:    enoxaparin ppx      I have performed a physical exam and reviewed and updated ROS and Plan today (12/5/2023). In review of yesterday's note (12/4/2023), there are no changes except as documented above.      Please note that this dictation was created using voice recognition software. I have made every reasonable attempt to correct obvious errors, but there may be errors of grammar and possibly content that I did not discover before finalizing the note.    Electronically signed by:  Dr. BANG Rodriguez, DNP, APRN, FNP-C  Hospitalist Services  [facility]  [phone]  [email or web address]  12/05/23                   [phone]

## 2023-12-05 NOTE — ED NOTES
Noted soaked diaper.  Pt offered bedpan. She refused to have straight catheter.    Pt unable to pee. Perineal care rendered and applied new pad.

## 2023-12-05 NOTE — ED PROVIDER NOTES
ED Provider Note    Scribed for Dr. Resendez by Donal Olvera. 2023,  8:36 PM.      CHIEF COMPLAINT  Chief Complaint   Patient presents with    Possible Stroke     Stroke like symptoms right side weakness x 1 month     EXTERNAL RECORDS REVIEWED  Outpatient Notes patient seen as an outpatient most recent visit 3/28/2022 for vitamin D deficiency, mixed hyperlipidemia    HPI  LIMITATION TO HISTORY   Select: : None  OUTSIDE HISTORIAN(S):  Family Son present at bedside provides context for duration of patient symptoms and Nursing provides additional historical context    Rama Bhatt is a 48 y.o. female who presents to the Emergency Department via EMS for evaluation of a possible stroke onset prior to arrival. The patient reports associated symptoms of right sided weakness onset one month ago, difficulty walking onset 2 weeks ago, recent cough due to COVID, but denies dizziness or fever. Nursing reports the patient experienced a ground level fall yesterday that she does not recall at this time. The patient denies any previous issues with her right eye. The patient denies alcohol use. She denies any other medical issues. She denies any allergies. There are no known alleviating or exacerbating factors.  The patient has a history of Bell's Palsy    The patient's son reports the patient's symptoms of right sided weakness have been present for around 4 months worsened in the last 3-4 days, especially worsening last night. He reports the patient experienced difficulty walking and speaking yesterday. The patient was walking and speaking normally this morning, not experiencing any difficulties until she arrived at work.     REVIEW OF SYSTEMS  See HPI for further details. All other systems are negative.     PAST MEDICAL HISTORY     Past Medical History:   Diagnosis Date    Bell's palsy 2004     SURGICAL HISTORY  Past Surgical History:   Procedure Laterality Date    GYN SURGERY           FAMILY HISTORY  Family  "History   Problem Relation Age of Onset    Diabetes Mother     Diabetes Father      SOCIAL HISTORY    reports that she has been smoking. She has a 3.5 pack-year smoking history. She has never used smokeless tobacco. She reports that she does not currently use alcohol. She reports current drug use. Drugs: Marijuana and Inhaled.    CURRENT MEDICATIONS  Home Medications    **Home medications have not yet been reviewed for this encounter**       ALLERGIES  Allergies   Allergen Reactions    Nkda [No Known Drug Allergy]        PHYSICAL EXAM  VITAL SIGNS: BP (!) 146/82   Pulse (!) 103   Temp (!) 38.3 °C (101 °F) (Temporal)   Resp 18   Ht 1.753 m (5' 9\")   Wt 81.6 kg (180 lb)   SpO2 96%   BMI 26.58 kg/m²   Gen: Alert, no acute distress  HEENT: ATNC, partial right sided facial palsy.   Eyes: Disconjugate gaze with lateral movement of the right eye, median palsy of the right eye. Palsy of the left eye unable to look across midline to the right. Left nystagmus on leftward gaze.   Neck: trachea midline  Resp: no respiratory distress clear to auscultation bilaterally  CV: No JVD, regular rate and rhythm, no murmurs, rubs,  Abd: non-distended, soft, nontender  Ext: No deformities  Neuro: Right arm drifts but does not hit the bed, resistance against fravity in the right lower extremity. Normal movement of the left lower extremity. speech fluent    DIAGNOSTIC STUDIES / PROCEDURES    LABS  Labs Reviewed   CBC WITH DIFFERENTIAL - Abnormal; Notable for the following components:       Result Value    Neutrophils-Polys 77.50 (*)     Lymphocytes 7.70 (*)     Lymphs (Absolute) 0.57 (*)     Monos (Absolute) 0.95 (*)     All other components within normal limits   COMP METABOLIC PANEL - Abnormal; Notable for the following components:    Glucose 105 (*)     Creatinine 0.48 (*)     Alkaline Phosphatase 103 (*)     All other components within normal limits   URINALYSIS - Abnormal; Notable for the following components:    Specific " Gravity >=1.045 (*)     Ph 8.5 (*)     Ketones 15 (*)     All other components within normal limits    Narrative:     Indication for culture:->Evaluation for sepsis without a  clear source of infection   POC COV-2, FLU A/B, RSV BY PCR - Abnormal; Notable for the following components:    POC SARS-CoV-2, PCR DETECTED (*)     All other components within normal limits   LACTIC ACID   ESTIMATED GFR   TSH WITH REFLEX TO FT4   URINE CULTURE(NEW)    Narrative:     Indication for culture:->Evaluation for sepsis without a  clear source of infection   BLOOD CULTURE    Narrative:     Blood Cultures X2. Draw one blood culture from central line  (including implanted port) and one blood culture  peripherally. If no central line present draw blood cultures  times two peripherally from different sites   BLOOD CULTURE    Narrative:     Blood Cultures X2. Draw one blood culture from central line  (including implanted port) and one blood culture  peripherally. If no central line present draw blood cultures  times two peripherally from different sites   LIPID PROFILE   HEMOGLOBIN A1C   PROTHROMBIN TIME   URINALYSIS   CULTURE RESPIRATORY W/ GRM STN   URINE CULTURE(NEW)   PROCALCITONIN   CRP QUANTITIVE (NON-CARDIAC)   CBC WITH DIFFERENTIAL   COMP METABOLIC PANEL   MAGNESIUM   PHOSPHORUS   POCT COV-2, FLU A/B, RSV BY PCR     RADIOLOGY  I have independently interpreted the diagnostic imaging associated with this visit:  Review of CTA-head reveals no intracranial bleed.     CT-CTA NECK WITH & W/O-POST PROCESSING   Final Result         1.  CT angiogram of the neck within normal limits.   2.  Diffuse enlargement of thyroid with multiple nodules, recommend follow-up thyroid sonography due to nodule size and diffusely enlarged thyroid.         CT-CTA HEAD WITH & W/O-POST PROCESS   Final Result         1.  No large vessel occlusion or aneurysm identified.   2.  Periventricular and subcortical white matter low-attenuation changes, nonspecific but  may represent changes of small vessel ischemia.   3.  Mild cerebral atrophy.      DX-CHEST-PORTABLE (1 VIEW)   Final Result         1.  Hazy left lower lobe infiltrate.        COURSE & MEDICAL DECISION MAKING  Pertinent Labs & Imaging studies were reviewed. (See chart for details)    ED Observation Status? No, the patient does not meet the criteria for ED observation at this time.   -----------    8:30 PM Patient seen and examined at bedside. I was called to the [facility] at 8:30 for emergent evaluation of a 48 year old female who presents for a possible stroke onset prior to arrival. Given time of symptoms onset, the patient does not qualify for tPa. Discussed my plan for hospitalization given the patient's high likelihood for intracranial abnormality, stroke, or sepsis. Alexsandra Hospitalist.     10:06 PM - Reviewed labs as noted above.     10:37 PM - Patient was reevaluated at bedside. Daughter now present at bedside. Discussed lab and radiology results with the patient and informed them of no significant findings for tumors on imaging, results significant for sepsis revealed via labs. I informed the patient of my plan for hospitalization. Alexsandra Hospitalist.     10:52 PM I discussed the patient's case and the above findings with Dr. Garcia (Hospitalist) who agreed to evaluate the patient for hospitalization.      HYDRATION: Based on the patient's presentation of Dehydration the patient was given IV fluids. IV Hydration was used because oral hydration was not adequate alone. Upon recheck following hydration, the patient was improved.     INITIAL ASSESSMENT AND PLAN  Medical Decision Making: Patient presents with new neurologic dysfunction, on my exam, she does have some faint right-sided facial weakness, however also has abdominal plegia, and weakness to the right upper and lower extremity.  This is concerning for possible stroke, however the time course is atypical.  Both the patient and family report that the eye  deviation has been subacute in nature and the difficulty walking started yesterday.  Due to this, she is outside of the window for tPA.  CTA performed demonstrates no large vessel occlusion, no indication for thrombectomy.    No evidence of obvious brain tumor or intracranial bleed.  Although she does not have the obvious encephalopathy consistent with Warnicke encephalopathy, she does have difficulty walking, and ophthalmoplegia, will treat with empiric thiamine.    Patient's vital signs consistent with sepsis with tachypnea, tachycardia, fever.  No leukocytosis but concerning for possible pneumonia on the chest x-ray.  She was treated empirically first with Zosyn, then after chest x-ray showing likely pneumonia with azithromycin.    Patient ultimately found to have COVID.  She is also noted to have thyromegaly, however normal TSH.  No evidence of thyrotoxicosis.     ADDITIONAL PROBLEM LIST AND DISPOSITION    I have discussed management of the patient with the following medical professionals:  Dr. Garcia (Hospitalist)        Barriers to care at this time, including but not limited to:  None noted at this time .     Decision tools and prescription drugs considered including, but not limited to: NIH Stroke Scale 6 .    DISPOSITION:  Patient will be hospitalized by Dr. Garcia (Hospitalist) in guarded condition.     FINAL IMPRESSION  1. Right sided weakness    2. Difficulty walking    3. Sepsis without acute organ dysfunction, due to unspecified organism (HCC)    4. Pneumonia of left lung due to infectious organism, unspecified part of lung    5. Thyromegaly    6. COVID-19          CRITICAL CARE  The very real possibilty of a deterioration of this patient's condition required the highest level of my preparedness for sudden, emergent intervention.  I provided critical care services, which included medication orders, frequent reevaluations of the patient's condition and response to treatment, ordering and reviewing test  results, and discussing the case with various consultants.  The critical care time associated with the care of the patient was 31 minutes. Review chart for interventions. This time is exclusive of any other billable procedures.         IDonal ([email or web address]), am scribing for, and in the presence of, Adan Resendez M.D..    Electronically signed by: Donal Olvera ([email or web address]), 12/4/2023    IAdan M.D. personally performed the services described in this documentation, as scribed by Donal Olvera in my presence, and it is both accurate and complete.    The note accurately reflects work and decisions made by me.  Adan Resendez M.D.  12/4/2023  11:17 PM      This dictation was created using voice recognition software. The accuracy of the dictation is limited to the abilities of the software. I expect there may be some errors of grammar and possibly content. The nursing notes were reviewed and certain aspects of this information were incorporated into this note.

## 2023-12-05 NOTE — ASSESSMENT & PLAN NOTE
Positive COVID in ER  Denies prior vaccination  Given Zosyn and azithromycin in ER, defer further antibiotic at this time given procalcitonin WNL  On room air, stop decadron  Assess the need for remdesivir/baricitinib daily

## 2023-12-06 ENCOUNTER — APPOINTMENT (OUTPATIENT)
Dept: CARDIOLOGY | Facility: MEDICAL CENTER | Age: 48
DRG: 058 | End: 2023-12-06
Attending: STUDENT IN AN ORGANIZED HEALTH CARE EDUCATION/TRAINING PROGRAM
Payer: MEDICAID

## 2023-12-06 ENCOUNTER — APPOINTMENT (OUTPATIENT)
Dept: RADIOLOGY | Facility: MEDICAL CENTER | Age: 48
DRG: 058 | End: 2023-12-06
Attending: STUDENT IN AN ORGANIZED HEALTH CARE EDUCATION/TRAINING PROGRAM
Payer: MEDICAID

## 2023-12-06 LAB
ANION GAP SERPL CALC-SCNC: 9 MMOL/L (ref 7–16)
BUN SERPL-MCNC: 11 MG/DL (ref 8–22)
CALCIUM SERPL-MCNC: 8.6 MG/DL (ref 8.5–10.5)
CHLORIDE SERPL-SCNC: 108 MMOL/L (ref 96–112)
CO2 SERPL-SCNC: 22 MMOL/L (ref 20–33)
CREAT SERPL-MCNC: 0.49 MG/DL (ref 0.5–1.4)
ERYTHROCYTE [DISTWIDTH] IN BLOOD BY AUTOMATED COUNT: 48.6 FL (ref 35.9–50)
GFR SERPLBLD CREATININE-BSD FMLA CKD-EPI: 115 ML/MIN/1.73 M 2
GLUCOSE SERPL-MCNC: 111 MG/DL (ref 65–99)
HCT VFR BLD AUTO: 34.6 % (ref 37–47)
HGB BLD-MCNC: 11.4 G/DL (ref 12–16)
LV EJECT FRACT  99904: 55
LV EJECT FRACT MOD 2C 99903: 70.2
LV EJECT FRACT MOD 4C 99902: 54.66
LV EJECT FRACT MOD BP 99901: 62.03
MCH RBC QN AUTO: 30.3 PG (ref 27–33)
MCHC RBC AUTO-ENTMCNC: 32.9 G/DL (ref 32.2–35.5)
MCV RBC AUTO: 92 FL (ref 81.4–97.8)
PLATELET # BLD AUTO: 264 K/UL (ref 164–446)
PMV BLD AUTO: 9.4 FL (ref 9–12.9)
POTASSIUM SERPL-SCNC: 3.8 MMOL/L (ref 3.6–5.5)
RBC # BLD AUTO: 3.76 M/UL (ref 4.2–5.4)
SODIUM SERPL-SCNC: 139 MMOL/L (ref 135–145)
WBC # BLD AUTO: 4.1 K/UL (ref 4.8–10.8)

## 2023-12-06 PROCEDURE — 99232 SBSQ HOSP IP/OBS MODERATE 35: CPT | Performed by: STUDENT IN AN ORGANIZED HEALTH CARE EDUCATION/TRAINING PROGRAM

## 2023-12-06 PROCEDURE — 97161 PT EVAL LOW COMPLEX 20 MIN: CPT

## 2023-12-06 PROCEDURE — 70551 MRI BRAIN STEM W/O DYE: CPT

## 2023-12-06 PROCEDURE — 700102 HCHG RX REV CODE 250 W/ 637 OVERRIDE(OP): Performed by: STUDENT IN AN ORGANIZED HEALTH CARE EDUCATION/TRAINING PROGRAM

## 2023-12-06 PROCEDURE — 93306 TTE W/DOPPLER COMPLETE: CPT

## 2023-12-06 PROCEDURE — 36415 COLL VENOUS BLD VENIPUNCTURE: CPT

## 2023-12-06 PROCEDURE — 85027 COMPLETE CBC AUTOMATED: CPT

## 2023-12-06 PROCEDURE — 770001 HCHG ROOM/CARE - MED/SURG/GYN PRIV*

## 2023-12-06 PROCEDURE — A9270 NON-COVERED ITEM OR SERVICE: HCPCS | Performed by: STUDENT IN AN ORGANIZED HEALTH CARE EDUCATION/TRAINING PROGRAM

## 2023-12-06 PROCEDURE — 93306 TTE W/DOPPLER COMPLETE: CPT | Mod: 26 | Performed by: INTERNAL MEDICINE

## 2023-12-06 PROCEDURE — 700111 HCHG RX REV CODE 636 W/ 250 OVERRIDE (IP): Mod: JZ | Performed by: STUDENT IN AN ORGANIZED HEALTH CARE EDUCATION/TRAINING PROGRAM

## 2023-12-06 PROCEDURE — 80048 BASIC METABOLIC PNL TOTAL CA: CPT

## 2023-12-06 PROCEDURE — 97165 OT EVAL LOW COMPLEX 30 MIN: CPT

## 2023-12-06 RX ADMIN — ASPIRIN 81 MG: 81 TABLET, COATED ORAL at 05:23

## 2023-12-06 RX ADMIN — DOCUSATE SODIUM 50 MG AND SENNOSIDES 8.6 MG 2 TABLET: 8.6; 5 TABLET, FILM COATED ORAL at 16:52

## 2023-12-06 RX ADMIN — DEXAMETHASONE 6 MG: 4 TABLET ORAL at 05:23

## 2023-12-06 RX ADMIN — ATORVASTATIN CALCIUM 80 MG: 80 TABLET, FILM COATED ORAL at 16:52

## 2023-12-06 RX ADMIN — ENOXAPARIN SODIUM 40 MG: 100 INJECTION SUBCUTANEOUS at 16:52

## 2023-12-06 RX ADMIN — NICOTINE TRANSDERMAL SYSTEM 21 MG: 21 PATCH, EXTENDED RELEASE TRANSDERMAL at 05:23

## 2023-12-06 RX ADMIN — DOCUSATE SODIUM 50 MG AND SENNOSIDES 8.6 MG 2 TABLET: 8.6; 5 TABLET, FILM COATED ORAL at 05:23

## 2023-12-06 ASSESSMENT — LIFESTYLE VARIABLES
EVER FELT BAD OR GUILTY ABOUT YOUR DRINKING: NO
HAVE YOU EVER FELT YOU SHOULD CUT DOWN ON YOUR DRINKING: NO
ON A TYPICAL DAY WHEN YOU DRINK ALCOHOL HOW MANY DRINKS DO YOU HAVE: 0
EVER HAD A DRINK FIRST THING IN THE MORNING TO STEADY YOUR NERVES TO GET RID OF A HANGOVER: NO
HAVE PEOPLE ANNOYED YOU BY CRITICIZING YOUR DRINKING: NO
TOTAL SCORE: 0
DOES PATIENT WANT TO STOP DRINKING: NO
TOTAL SCORE: 0
AVERAGE NUMBER OF DAYS PER WEEK YOU HAVE A DRINK CONTAINING ALCOHOL: 0
HOW MANY TIMES IN THE PAST YEAR HAVE YOU HAD 5 OR MORE DRINKS IN A DAY: 0
CONSUMPTION TOTAL: NEGATIVE
ALCOHOL_USE: NO
TOTAL SCORE: 0

## 2023-12-06 ASSESSMENT — COGNITIVE AND FUNCTIONAL STATUS - GENERAL
DRESSING REGULAR UPPER BODY CLOTHING: A LITTLE
SUGGESTED CMS G CODE MODIFIER DAILY ACTIVITY: CJ
MOBILITY SCORE: 21
DRESSING REGULAR LOWER BODY CLOTHING: A LITTLE
TOILETING: A LITTLE
DAILY ACTIVITIY SCORE: 21
PERSONAL GROOMING: A LITTLE
DRESSING REGULAR UPPER BODY CLOTHING: A LITTLE
STANDING UP FROM CHAIR USING ARMS: A LITTLE
DRESSING REGULAR LOWER BODY CLOTHING: A LITTLE
WALKING IN HOSPITAL ROOM: A LITTLE
CLIMB 3 TO 5 STEPS WITH RAILING: A LITTLE
PERSONAL GROOMING: A LITTLE
DAILY ACTIVITIY SCORE: 19
MOBILITY SCORE: 23
CLIMB 3 TO 5 STEPS WITH RAILING: A LITTLE
SUGGESTED CMS G CODE MODIFIER MOBILITY: CJ
SUGGESTED CMS G CODE MODIFIER DAILY ACTIVITY: CK
SUGGESTED CMS G CODE MODIFIER MOBILITY: CI
HELP NEEDED FOR BATHING: A LITTLE

## 2023-12-06 ASSESSMENT — PAIN DESCRIPTION - PAIN TYPE
TYPE: ACUTE PAIN
TYPE: OTHER (COMMENT)

## 2023-12-06 ASSESSMENT — ENCOUNTER SYMPTOMS
COUGH: 1
EYES NEGATIVE: 1
GASTROINTESTINAL NEGATIVE: 1
WEAKNESS: 1
WHEEZING: 1
MYALGIAS: 1
FEVER: 0
SHORTNESS OF BREATH: 1
CHILLS: 0
PSYCHIATRIC NEGATIVE: 1

## 2023-12-06 ASSESSMENT — GAIT ASSESSMENTS
DISTANCE (FEET): 20
GAIT LEVEL OF ASSIST: SUPERVISED
DEVIATION: BRADYKINETIC;INCREASED BASE OF SUPPORT

## 2023-12-06 ASSESSMENT — PATIENT HEALTH QUESTIONNAIRE - PHQ9
2. FEELING DOWN, DEPRESSED, IRRITABLE, OR HOPELESS: NOT AT ALL
1. LITTLE INTEREST OR PLEASURE IN DOING THINGS: NOT AT ALL
SUM OF ALL RESPONSES TO PHQ9 QUESTIONS 1 AND 2: 0

## 2023-12-06 ASSESSMENT — FIBROSIS 4 INDEX: FIB4 SCORE: 1.22

## 2023-12-06 ASSESSMENT — ACTIVITIES OF DAILY LIVING (ADL): TOILETING: INDEPENDENT

## 2023-12-06 NOTE — THERAPY
Physical Therapy   Initial Evaluation     Patient Name: Rama Bhatt  Age:  48 y.o., Sex:  female  Medical Record #: [id number]  Today's Date: 12/6/2023     Precautions  Precautions: Fall Risk  Comments: (P) COVID-19 precautions    Assessment  Patient is 48 y.o. female who presented 12/4/2023 with evaluation for URI symptoms, right-sided weakness.     Currently been managed for R/O CVA, PNA due to COVID-19    MRI-Pending   PMH: hyperlipidemia, tobacco abuse      Patient seen for PT evaluation. Patient in bed, agreeable for the session. Patient able to demonstrate functional mobility tasks as detailed below. Appears to be close to her baseline mobility. Anticipate no further PT needs at this time.     Recommend OOB to chair for meals and ambulate in the room multiple times during the day as able.     Plan    Physical Therapy Initial Treatment Plan   Duration: (P) Discharge Needs Only    DC Equipment Recommendations: (P) None  Discharge Recommendations: (P) Anticipate that the patient will have no further physical therapy needs after discharge from the hospital    Objective     12/06/23 1213   Charge Group   PT Evaluation PT Evaluation Low   Total Time Spent   PT Evaluation Time Spent (Mins) 20   Initial Contact Note    Initial Contact Note Order Received and Verified, Physical Therapy Evaluation in Progress with Full Report to Follow.   Precautions   Precautions Fall Risk;Other (See Comments)   Comments COVID-19 precautions   Vitals   O2 Delivery Device None - Room Air   Pain   Pain Scales 0 to 10 Scale    Intervention Declines   Prior Living Situation   Prior Services None   Housing / Facility [address]   Steps Into Home 1  (Via front entrance; 0-garage entrance)   Steps In Home 0   Equipment Owned None   Lives with - Patient's Self Care Capacity Adult Children  (Teenage Son)   Comments Son goes to college but able to assist if needed   Prior Level of Functional Mobility   Bed Mobility Independent    Transfer Status Independent   Ambulation Independent   Ambulation Distance Community   Assistive Devices Used None   Stairs Independent   Comments Patient works FT, she is on her feet all day and does lift heavy weights at work.   History of Falls   History of Falls No   Date of Last Fall   (Patient mentioned that boxes fell on her head prior to arrival)   Cognition    Level of Consciousness Alert   Passive ROM Lower Body   Passive ROM Lower Body WDL   Active ROM Lower Body    Active ROM Lower Body  WDL   Strength Lower Body   Lower Body Strength  X   Comments Grossly BLE 4/5   Sensation Lower Body   Lower Extremity Sensation   WDL   Lower Body Muscle Tone   Lower Body Muscle Tone  WDL   Coordination Lower Body    Coordination Lower Body  X   Comments Able to perform heel to shin & alternate ankle pumps-slow speed   Other Treatments   Other Treatments Provided Patient educated about daily mobility/ambulation, OOB to chair for meals, importance of upright positioning and changing positions every 2 hours   Balance Assessment   Sitting Balance (Static) Good   Sitting Balance (Dynamic) Fair +   Standing Balance (Static) Fair   Standing Balance (Dynamic) Fair   Weight Shift Sitting Good   Weight Shift Standing Fair   Comments Standing W/O AD; Seated EOB   Bed Mobility    Supine to Sit Supervised   Scooting Supervised   Rolling Supervised   Comments HOB flat   Gait Analysis   Gait Level Of Assist Supervised   Assistive Device None   Distance (Feet) 20   # of Times Distance was Traveled 6   Deviation Bradykinetic;Increased Base Of Support   Comments Patient ambulated holding onto a container without loss of balance. Able to pick the container from the floor and place it back on the floor without loss of balance.   Functional Mobility   Sit to Stand Supervised   Bed, Chair, Wheelchair Transfer Supervised   Transfer Method Stand Step   Mobility Bed-chair, W/O AD   How much difficulty does the patient currently have...    Turning over in bed (including adjusting bedclothes, sheets and blankets)? 4   Sitting down on and standing up from a chair with arms (e.g., wheelchair, bedside commode, etc.) 4   Moving from lying on back to sitting on the side of the bed? 4   How much help from another person does the patient currently need...   Moving to and from a bed to a chair (including a wheelchair)? 4   Need to walk in a hospital room? 4   Climbing 3-5 steps with a railing? 3   6 clicks Mobility Score 23   Activity Tolerance   Sitting in Chair Post session   Patient / Family Goals    Patient / Family Goal #1 To return home   Education Group   Education Provided Role of Physical Therapist   Role of Physical Therapist Patient Response Patient;Acceptance;Explanation;Verbal Demonstration   Physical Therapy Initial Treatment Plan    Duration Discharge Needs Only   Anticipated Discharge Equipment and Recommendations   DC Equipment Recommendations None   Discharge Recommendations Anticipate that the patient will have no further physical therapy needs after discharge from the hospital   Interdisciplinary Plan of Care Collaboration   IDT Collaboration with  Nursing   Patient Position at End of Therapy Seated;Chair Alarm On;Call Light within Reach;Tray Table within Reach   Session Information   Date / Session Number  12/6-Eval done, DC needs only

## 2023-12-06 NOTE — CARE PLAN
The patient is Stable - Low risk of patient condition declining or worsening    Shift Goals  Clinical Goals: Safety, participate in care  Patient Goals: Rest, feel better  Family Goals: RODOLFO    Progress made toward(s) clinical / shift goals:  Patient is AAOx4, education provided on safety and using call light for assistance. Patient demonstrates understanding of education given. Bed low, locked and call light in reach.    Problem: Optimal Care of the Stroke Patient  Goal: Optimal acute care for the stroke patient  Outcome: Progressing  Note: Core measures in place. Education provided on medication, safety and stroke work up  - Vital signs and neuro checks performed and documented per order  - NIHSS completed and documented per order  - Continuous telemetry monitoring for 72 hours or until discontinued by provider  - Head CT without contrast obtained  - Consideration of MRI/MRA  - MRI screening form completed in worklist if MRI ordered     Problem: Neuro Status  Goal: Neuro status will remain stable or improve  Outcome: Progressing  Note: Neuro checks in place, patient remains AAOx4     Patient is not progressing towards the following goals:    Problem: Psychosocial - Patient Condition  Goal: Patient's ability to verbalize feelings about condition will improve  Outcome: Not Progressing  Note: Patient is lethargic and quiet, will continue to encourage patient to expresses needs and concerns  1.  Discuss coping with medical condition and its effects  2.  Encourage patient participation in care  3.  Encourage acknowledgement of body changes and accompanying emotions  4.  Perform depression screening

## 2023-12-06 NOTE — PROGRESS NOTES
4 Eyes Skin Assessment Completed by JOSH Rocha and JOSH Madrid.    Head WDL  Ears WDL  Nose WDL  Mouth WDL  Neck WDL  Breast/Chest WDL  Shoulder Blades WDL  Spine WDL  (R) Arm/Elbow/Hand Redness, Non-Blanching, Bruising, and Discoloration  (L) Arm/Elbow/Hand Bruising  Abdomen WDL  Groin WDL  Scrotum/Coccyx/Buttocks WDL  (R) Leg WDL  (L) Leg WDL  (R) Heel/Foot/Toe Redness, Bruising, and Scab  (L) Heel/Foot/Toe Redness, Bruising, and Scab          Devices In Places SCD's      Interventions In Place Pillows, Dri-Tera Pads, Heels Loaded W/Pillows, and Pressure Redistribution Mattress    Possible Skin Injury Yes    Pictures Uploaded Into Epic Yes  Wound Consult Placed Yes  RN Wound Prevention Protocol Ordered Yes

## 2023-12-06 NOTE — CARE PLAN
The patient is Stable - Low risk of patient condition declining or worsening    Shift Goals  Clinical Goals: MRI., echo  Patient Goals: comfort  Family Goals: RODOLFO    Progress made toward(s) clinical / shift goals:  MRI to be done after 1700today.   Echo complete    Problem: Pain - Standard  Goal: Alleviation of pain or a reduction in pain to the patient’s comfort goal  Description: Target End Date:  Prior to discharge or change in level of care    Document on Vitals flowsheet    1.  Document pain using the appropriate pain scale per order or unit policy  2.  Educate and implement non-pharmacologic comfort measures (i.e. relaxation, distraction, massage, cold/heat therapy, etc.)  3.  Pain management medications as ordered  4.  Reassess pain after pain med administration per policy  5.  If opiods administered assess patient's response to pain medication is appropriate per POSS sedation scale  6.  Follow pain management plan developed in collaboration with patient and interdisciplinary team (including palliative care or pain specialists if applicable)  Outcome: Progressing     Problem: Neuro Status  Goal: Neuro status will remain stable or improve  Description: Target End Date:  Prior to discharge or change in level of care    Document on Neuro assessment in the Assessment flowsheet    1.  Assess and monitor neurologic status per provider order/protocol/unit policy  2.  Assess level of consciousness and orientation  3.  Assess for speech, dysarthria, dysphagia, facial symmetry  4.  Assess visual field, eye movements, gaze preference, pupil reaction and size  5.  Assess muscle strength and motor response in all four extremities  6.  Assess for sensation (numbness and tingling)  7.  Assess basic neuro reflexes (cough, gag, corneal)  8.  Identify changes in neuro status and report to provider for testing/treatment orders  Outcome: Progressing  Note: Neuro assessment stable          Patient is not progressing towards the  following goals:

## 2023-12-06 NOTE — THERAPY
Occupational Therapy   Initial Evaluation     Patient Name: Rama Bhatt  Age:  48 y.o., Sex:  female  Medical Record #: [id number]  Today's Date: 12/6/2023     Precautions  Precautions: Fall Risk  Comments: COVID+    Assessment    Patient is 48 y.o. female admitted with upper respiratory infection and R sided weakness. Pt found to have COVID. Pt is pending MRI at this time. Other pertinent medical history includes HLD, PNA, and tobacco use. Pt seen for OT evaluation and treatment. Pt donned/doffed socks, performed toilet hygiene, performed toilet transfer, and stood to wash hands/wet hair w/ supv-SBA. Pt reported that she lives with her son who can assist as able when he is home from work. Pt educated regarding the role of OT. No further OT needs anticipated at this time. Patient will not be actively followed for occupational therapy services at this time, however may be seen if requested by physician for 1 more visit within 30 days to address any discharge or equipment needs.      Plan    Occupational Therapy Initial Treatment Plan   Duration: Discharge Needs Only    DC Equipment Recommendations: None (already has shower chair)  Discharge Recommendations: Anticipate that the patient will have no further occupational therapy needs after discharge from the hospital      Objective     12/06/23 1242   Prior Living Situation   Prior Services None   Housing / Facility 1 Story House   Steps Into Home 0   Steps In Home 0   Bathroom Set up Bathtub / Shower Combination;Shower Chair;Grab Bars   Equipment Owned Tub / Shower Seat;Grab Bar(s) In Tub / Shower   Lives with - Patient's Self Care Capacity Adult Children   Comments Pt lives with her son who can assist as needed when home.   Prior Level of ADL Function   Self Feeding Independent   Grooming / Hygiene Independent   Bathing Independent   Dressing Independent   Toileting Independent   Prior Level of IADL Function   Medication Management Independent   Laundry  Independent   Kitchen Mobility Independent   Finances Independent   Home Management Independent   Shopping Independent   Prior Level Of Mobility Independent Without Device in Community;Supervision Without Device in Home   Driving / Transportation Driving Independent   Occupation (Pre-Hospital Vocational) Requires Physical Labor   Precautions   Precautions Fall Risk   Pain   Pain Scales 0 to 10 Scale    Pain 0 - 10 Group   Therapist Pain Assessment Post Activity Pain Same as Prior to Activity;Nurse Notified;0   Cognition    Cognition / Consciousness WDL   Level of Consciousness Alert   Comments Pleasant and cooperative   Passive ROM Upper Body   Passive ROM Upper Body WDL   Active ROM Upper Body   Active ROM Upper Body  WDL   Strength Upper Body   Comments R UE 3+/5, L UE 4/5   Sensation Upper Body   Comments denied numbness/tingling   Upper Body Muscle Tone   Upper Body Muscle Tone  WDL   Coordination Upper Body   Comments Slightl impairement to JOSE B UE, FTN testing WDL   Balance Assessment   Sitting Balance (Static) Fair +   Sitting Balance (Dynamic) Fair +   Standing Balance (Static) Fair   Standing Balance (Dynamic) Fair   Weight Shift Sitting Good   Weight Shift Standing Fair   Comments w/ no AD   Bed Mobility    Comments up to chair pre/post   ADL Assessment   Eating Modified Independent  (opening applesauce upon entry)   Grooming Supervision;Standing  (washed hands at sink, wet hair at sink)   Lower Body Dressing Supervision  (don/doff socks)   Toileting Supervision  (urine on toilet)   Functional Mobility   Sit to Stand Supervised   Bed, Chair, Wheelchair Transfer Supervised   Toilet Transfers Supervised   Transfer Method Stand Step   Mobility chair>bathroom>chair   Comments w/ no AD   Visual Perception   Comments declined diplopia and photosensitivity, peripheral vision WFL, visual tracking WFL   Activity Tolerance   Sitting in Chair up pre/post   Sitting Edge of Bed NT   Standing >5 min   Comments  functional   Education Group   Education Provided Role of Occupational Therapist;Activities of Daily Living   Role of Occupational Therapist Patient Response Patient;Acceptance;Explanation;Verbal Demonstration   ADL Patient Response Patient;Acceptance;Explanation;Demonstration;Action Demonstration;Verbal Demonstration   Use of Call Light Patient Response Patient;Acceptance;Explanation;Demonstration;Verbal Demonstration   Occupational Therapy Initial Treatment Plan    Duration Discharge Needs Only

## 2023-12-07 LAB
BACTERIA UR CULT: NORMAL
SIGNIFICANT IND 70042: NORMAL
SITE SITE: NORMAL
SOURCE SOURCE: NORMAL

## 2023-12-07 PROCEDURE — 99254 IP/OBS CNSLTJ NEW/EST MOD 60: CPT | Performed by: PSYCHIATRY & NEUROLOGY

## 2023-12-07 PROCEDURE — 82784 ASSAY IGA/IGD/IGG/IGM EACH: CPT | Mod: 91

## 2023-12-07 PROCEDURE — 700102 HCHG RX REV CODE 250 W/ 637 OVERRIDE(OP): Performed by: STUDENT IN AN ORGANIZED HEALTH CARE EDUCATION/TRAINING PROGRAM

## 2023-12-07 PROCEDURE — 83916 OLIGOCLONAL BANDS: CPT

## 2023-12-07 PROCEDURE — 82040 ASSAY OF SERUM ALBUMIN: CPT

## 2023-12-07 PROCEDURE — A9270 NON-COVERED ITEM OR SERVICE: HCPCS | Performed by: STUDENT IN AN ORGANIZED HEALTH CARE EDUCATION/TRAINING PROGRAM

## 2023-12-07 PROCEDURE — 82042 OTHER SOURCE ALBUMIN QUAN EA: CPT

## 2023-12-07 PROCEDURE — 770001 HCHG ROOM/CARE - MED/SURG/GYN PRIV*

## 2023-12-07 PROCEDURE — 99232 SBSQ HOSP IP/OBS MODERATE 35: CPT | Performed by: STUDENT IN AN ORGANIZED HEALTH CARE EDUCATION/TRAINING PROGRAM

## 2023-12-07 RX ADMIN — NICOTINE TRANSDERMAL SYSTEM 21 MG: 21 PATCH, EXTENDED RELEASE TRANSDERMAL at 05:31

## 2023-12-07 RX ADMIN — DOCUSATE SODIUM 50 MG AND SENNOSIDES 8.6 MG 2 TABLET: 8.6; 5 TABLET, FILM COATED ORAL at 18:25

## 2023-12-07 ASSESSMENT — ENCOUNTER SYMPTOMS
FEVER: 0
SHORTNESS OF BREATH: 1
PSYCHIATRIC NEGATIVE: 1
GASTROINTESTINAL NEGATIVE: 1
WHEEZING: 1
CHILLS: 0
COUGH: 1
MYALGIAS: 1
EYES NEGATIVE: 1
WEAKNESS: 1

## 2023-12-07 ASSESSMENT — PAIN DESCRIPTION - PAIN TYPE
TYPE: ACUTE PAIN

## 2023-12-07 NOTE — PROGRESS NOTES
Spoke to pt's sister, Mary  She updated her on d/c and thatMD will call pt with MRI results,. Mary agreeable and will pick her up in 15 min.    Called Mary and let her know the MD wants to keep her sister over night. Mary jo

## 2023-12-07 NOTE — DISCHARGE PLANNING
Rama is not requiring 2 of 3 disciplines.  [facility] will no longer follow.  Please reach out to myself with any questions.

## 2023-12-07 NOTE — DOCUMENTATION QUERY
[facility]                                                                       Query Response Note      PATIENT:               JOHN DAILEY  ACCT #:                  [id number]  MRN:                     [id number]  :                      1975  ADMIT DATE:       2023 8:15 PM  DISCH DATE:          RESPONDING  PROVIDER #:        [id number]           QUERY TEXT:    Sepsis has been documented in ED note.     Please clarify the status of sepsis by providing sepsis-related organ dysfunction.    Sepsis - real or suspected infection plus 2 or more SIRS criteria + organ dysfunction related to sepsis    Temp <96.8 or >101  HR >90  RR >20  WBC <4,000 or > 12,000  >10% bandemia         The patient's Clinical Indicators include:  H&P: In ER, initially thought to be sepsis due to fever 101 Fahrenheit, tachycardic --patient received empiric Zosyn and azithromycin by ERP.    Clinical Findings (): WBC 7.4, TMax 38.3, HR up to 103, RR up to 23, lactic acid 1.3, procal 0.09  Risk Factors: Pneumonia due to COVID-19 infection   Treatment: Zosyn & azithromycin in ED     Please contact me for any questions.    Thank you for time and attention,  MIGUELANGEL Marie RN  Available via Voalte  Options provided:   -- Sepsis exists, (Please provide sepsis-related organ dysfunction)   -- Sepsis does not exist/has been ruled out   -- Other explanation, (Please specify other explanation)      Query created by: Lisandra Cortez on 2023 12:49 PM    RESPONSE TEXT:    Sepsis does not exist/has been ruled out          Electronically signed by:  TL MONZON MD 2023 6:58 PM

## 2023-12-07 NOTE — CARE PLAN
The patient is Watcher - Medium risk of patient condition declining or worsening    Shift Goals  Clinical Goals: monitor for neuro changes, mobility, safety  Patient Goals: rest and sleep  Family Goals: RODOLFO    Progress made toward(s) clinical / shift goals:    Problem: Neuro Status  Goal: Neuro status will remain stable or improve  Outcome: Progressing       Patient is not progressing towards the following goals:      Problem: Self Care  Goal: Patient will have the ability to perform ADLs independently or with assistance (bathe, groom, dress, toilet and feed)  Outcome: Not Met     X1 STBY assist with ambulation

## 2023-12-07 NOTE — PROGRESS NOTES
Monitor summary:  SR 70-73, AK 0.16, QRS 0.06, QT 0.39,  per strip from monitor room.   DC at 1757

## 2023-12-07 NOTE — PROGRESS NOTES
[facility] Medicine Daily Progress Note    Date of Service  12/6/2023    Chief Complaint  Rama Bhatt is a 48 y.o. female admitted 12/4/2023 with URI and RIGHT sided weakness    Hospital Course  Ms. Rama Bhatt is a 48 y.o. female with history of hyperlipidemia, tobacco abuse who presented 12/4/2023 with evaluation for URI symptoms, right-sided weakness.      Patient reported not feeling well for the past 2 weeks to 4 weeks, endorsed fever, chills, cough.  She also noted to have weakness of right side which has been ongoing for the past 2 weeks.  As symptoms not resolving, patient was brought to ER for evaluation.  In ER, initially thought to be sepsis due to fever 101 Fahrenheit, tachycardic --patient received empiric Zosyn and azithromycin by ERP.  CTA head and neck did not show LVO or dissection.  Admission requested by ERP.  Admitted to medicine service for further evaluation and treatment.    In ER, patient started on Zosyn 4.5 g, azithromycin 500 mg IV, thiamine 200 mg IV, 1 L NS bolus. Pending Echo and MRI Brain.     Interval Problem Update  No acute events overnight.  Patient reports her right leg weakness has improved.  Ambulating well with PT/OT who recommend no post acute needs.  Echo reviewed showing normal findings.  MRI brain reviewed showing extensive demyelinating plaques.  Will consult neurology in AM.  Given improvement in symptoms will hold off on steroids for now.  Can stop aspirin, statin, cardiac monitoring as stroke is ruled out.    I have discussed this patient's plan of care and discharge plan at IDT rounds today with Case Management, Nursing, Nursing leadership, and other members of the IDT team.    Consultants/Specialty  NONE    Code Status  Full Code    Disposition  The patient is not medically cleared for discharge to home or a post-acute facility.  Anticipate discharge to: home with close outpatient follow-up    I have placed the appropriate orders for post-discharge  needs.    Review of Systems  Review of Systems   Constitutional:  Positive for malaise/fatigue. Negative for chills and fever.   HENT: Negative.     Eyes: Negative.    Respiratory:  Positive for cough, shortness of breath and wheezing.    Cardiovascular:  Positive for chest pain.   Gastrointestinal: Negative.    Genitourinary: Negative.    Musculoskeletal:  Positive for myalgias.   Skin: Negative.    Neurological:  Positive for weakness.   Endo/Heme/Allergies: Negative.    Psychiatric/Behavioral: Negative.          Physical Exam  Temp:  [36.1 °C (97 °F)-36.5 °C (97.7 °F)] 36.5 °C (97.7 °F)  Pulse:  [58-80] 62  Resp:  [16-18] 16  BP: (121-129)/(70-72) 123/71  SpO2:  [98 %-100 %] 99 %    Physical Exam  Vitals and nursing note reviewed.   HENT:      Head: Normocephalic.      Nose: Nose normal.      Mouth/Throat:      Mouth: Mucous membranes are moist.      Pharynx: Oropharynx is clear.   Eyes:      Pupils: Pupils are equal, round, and reactive to light.   Cardiovascular:      Rate and Rhythm: Normal rate and regular rhythm.      Pulses: Normal pulses.      Heart sounds: Normal heart sounds.   Pulmonary:      Effort: Pulmonary effort is normal.      Breath sounds: Normal breath sounds.   Abdominal:      General: Bowel sounds are normal.      Palpations: Abdomen is soft.   Musculoskeletal:         General: Tenderness present.      Cervical back: Normal range of motion and neck supple.   Skin:     General: Skin is dry.      Capillary Refill: Capillary refill takes 2 to 3 seconds.   Neurological:      Mental Status: She is alert. Mental status is at baseline.         Fluids    Intake/Output Summary (Last 24 hours) at 12/6/2023 1834  Last data filed at 12/6/2023 0400  Gross per 24 hour   Intake 0 ml   Output 0 ml   Net 0 ml       Laboratory  Recent Labs     12/04/23  2031 12/05/23  0013 12/06/23  0600   WBC 7.4 5.4 4.1*   RBC 4.32 3.77* 3.76*   HEMOGLOBIN 13.4 11.4* 11.4*   HEMATOCRIT 39.7 35.0* 34.6*   MCV 91.9 92.8 92.0    MCH 31.0 30.2 30.3   MCHC 33.8 32.6 32.9   RDW 48.1 48.7 48.6   PLATELETCT 320 275 264   MPV 9.4 9.5 9.4     Recent Labs     12/04/23  2031 12/05/23  0013 12/06/23  0600   SODIUM 139 136 139   POTASSIUM 4.3 3.9 3.8   CHLORIDE 104 105 108   CO2 24 19* 22   GLUCOSE 105* 105* 111*   BUN 9 8 11   CREATININE 0.48* 0.50 0.49*   CALCIUM 8.8 7.9* 8.6     Recent Labs     12/05/23  0013   INR 1.18*         Recent Labs     12/05/23  0013   TRIGLYCERIDE 45   HDL 52   LDL 96       Imaging  MR-BRAIN-W/O   Final Result      1.  There are extensive demyelinating plaques in the subcortical and periventricular white matter, brainstem and cerebellar hemispheres. Contrast-enhanced study is needed for the assessment of any possible acute demyelinating plaque.   2.  Mild to moderate cerebral volume loss.      EC-ECHOCARDIOGRAM COMPLETE W/O CONT   Final Result      CT-CTA NECK WITH & W/O-POST PROCESSING   Final Result         1.  CT angiogram of the neck within normal limits.   2.  Diffuse enlargement of thyroid with multiple nodules, recommend follow-up thyroid sonography due to nodule size and diffusely enlarged thyroid.         CT-CTA HEAD WITH & W/O-POST PROCESS   Final Result         1.  No large vessel occlusion or aneurysm identified.   2.  Periventricular and subcortical white matter low-attenuation changes, nonspecific but may represent changes of small vessel ischemia.   3.  Mild cerebral atrophy.      DX-CHEST-PORTABLE (1 VIEW)   Final Result         1.  Hazy left lower lobe infiltrate.           Assessment/Plan  * Right sided weakness- (present on admission)  Assessment & Plan  R/o CVA  Possibly sequela of COVID  Ongoing symptoms for at least the past 2 weeks  No LVO or dissection seen on CTA head and neck  Echo is normal  MRI brain shows extensive demyelinating plaques  Patients symptoms have improved, possible due to decadron use for COVID which has been stopped as patient is not hypoxic  Hold steroids for now as symptoms  have resolved  Neurology consult in AM    Tobacco abuse  Assessment & Plan  Cessation counseling provided: 5 minutes  Offered nicotine patch, nicotine gum, Chantix as alternative.  Provided patient with standard tobacco cessation information per protocol    Acute febrile illness  Assessment & Plan  Likely secondary to COVID    Pneumonia due to COVID-19 virus  Assessment & Plan  Positive COVID in ER  Denies prior vaccination  Given Zosyn and azithromycin in ER, defer further antibiotic at this time given procalcitonin WNL  On room air, stop decadron  Assess the need for remdesivir/baricitinib daily    Mixed hyperlipidemia- (present on admission)  Assessment & Plan  Continue statin         VTE prophylaxis:   SCDs/TEDs

## 2023-12-08 ENCOUNTER — APPOINTMENT (OUTPATIENT)
Dept: RADIOLOGY | Facility: MEDICAL CENTER | Age: 48
DRG: 058 | End: 2023-12-08
Attending: STUDENT IN AN ORGANIZED HEALTH CARE EDUCATION/TRAINING PROGRAM
Payer: MEDICAID

## 2023-12-08 LAB
BASOPHILS # BLD AUTO: 0.9 % (ref 0–1.8)
BASOPHILS # BLD: 0.05 K/UL (ref 0–0.12)
BURR CELLS/RBC NFR CSF MANUAL: 0 %
BURR CELLS/RBC NFR CSF MANUAL: 0 %
CLARITY CSF: CLEAR
CLARITY CSF: CLEAR
COLOR CSF: COLORLESS
COLOR CSF: COLORLESS
COLOR SPUN CSF: COLORLESS
COLOR SPUN CSF: COLORLESS
CSF COMMENTS 1658: NORMAL
CSF COMMENTS 1658: NORMAL
EOSINOPHIL # BLD AUTO: 0.01 K/UL (ref 0–0.51)
EOSINOPHIL NFR BLD: 0.2 % (ref 0–6.9)
ERYTHROCYTE [DISTWIDTH] IN BLOOD BY AUTOMATED COUNT: 47.9 FL (ref 35.9–50)
GLUCOSE CSF-MCNC: 64 MG/DL (ref 40–80)
GRAM STN SPEC: NORMAL
HCT VFR BLD AUTO: 37.5 % (ref 37–47)
HGB BLD-MCNC: 12.4 G/DL (ref 12–16)
IMM GRANULOCYTES # BLD AUTO: 0 K/UL (ref 0–0.11)
IMM GRANULOCYTES NFR BLD AUTO: 0 % (ref 0–0.9)
LYMPHOCYTES # BLD AUTO: 2.54 K/UL (ref 1–4.8)
LYMPHOCYTES NFR BLD: 43.5 % (ref 22–41)
MCH RBC QN AUTO: 30.5 PG (ref 27–33)
MCHC RBC AUTO-ENTMCNC: 33.1 G/DL (ref 32.2–35.5)
MCV RBC AUTO: 92.4 FL (ref 81.4–97.8)
MONOCYTES # BLD AUTO: 0.52 K/UL (ref 0–0.85)
MONOCYTES NFR BLD AUTO: 8.9 % (ref 0–13.4)
NEUTROPHILS # BLD AUTO: 2.72 K/UL (ref 1.82–7.42)
NEUTROPHILS NFR BLD: 46.5 % (ref 44–72)
NRBC # BLD AUTO: 0 K/UL
NRBC BLD-RTO: 0 /100 WBC (ref 0–0.2)
NUC CELL # CSF: 1 CELLS/UL (ref 0–10)
NUC CELL # CSF: 1 CELLS/UL (ref 0–10)
PLATELET # BLD AUTO: 300 K/UL (ref 164–446)
PMV BLD AUTO: 9.6 FL (ref 9–12.9)
PROT CSF-MCNC: 27 MG/DL (ref 15–45)
RBC # BLD AUTO: 4.06 M/UL (ref 4.2–5.4)
RBC # CSF: 32 CELLS/UL
RBC # CSF: <1 CELLS/UL
SIGNIFICANT IND 70042: NORMAL
SITE SITE: NORMAL
SOURCE SOURCE: NORMAL
SPECIMEN VOL CSF: 13 ML
SPECIMEN VOL CSF: 13 ML
TUBE # CSF: 4
TUBE # CSF: 4
TUBE # CSF: NORMAL
TUBE # CSF: NORMAL
VIT B12 SERPL-MCNC: 403 PG/ML (ref 211–911)
WBC # BLD AUTO: 5.8 K/UL (ref 4.8–10.8)

## 2023-12-08 PROCEDURE — 770001 HCHG ROOM/CARE - MED/SURG/GYN PRIV*

## 2023-12-08 PROCEDURE — 36415 COLL VENOUS BLD VENIPUNCTURE: CPT

## 2023-12-08 PROCEDURE — 87205 SMEAR GRAM STAIN: CPT

## 2023-12-08 PROCEDURE — 72157 MRI CHEST SPINE W/O & W/DYE: CPT

## 2023-12-08 PROCEDURE — 82945 GLUCOSE OTHER FLUID: CPT

## 2023-12-08 PROCEDURE — 62270 DX LMBR SPI PNXR: CPT | Performed by: INTERNAL MEDICINE

## 2023-12-08 PROCEDURE — A9579 GAD-BASE MR CONTRAST NOS,1ML: HCPCS | Performed by: STUDENT IN AN ORGANIZED HEALTH CARE EDUCATION/TRAINING PROGRAM

## 2023-12-08 PROCEDURE — 82607 VITAMIN B-12: CPT

## 2023-12-08 PROCEDURE — 85025 COMPLETE CBC W/AUTO DIFF WBC: CPT

## 2023-12-08 PROCEDURE — 87070 CULTURE OTHR SPECIMN AEROBIC: CPT

## 2023-12-08 PROCEDURE — A9270 NON-COVERED ITEM OR SERVICE: HCPCS | Performed by: STUDENT IN AN ORGANIZED HEALTH CARE EDUCATION/TRAINING PROGRAM

## 2023-12-08 PROCEDURE — 700117 HCHG RX CONTRAST REV CODE 255: Performed by: STUDENT IN AN ORGANIZED HEALTH CARE EDUCATION/TRAINING PROGRAM

## 2023-12-08 PROCEDURE — 86052 AQUAPORIN-4 ANTB CBA EACH: CPT

## 2023-12-08 PROCEDURE — 82164 ANGIOTENSIN I ENZYME TEST: CPT

## 2023-12-08 PROCEDURE — 700102 HCHG RX REV CODE 250 W/ 637 OVERRIDE(OP): Performed by: STUDENT IN AN ORGANIZED HEALTH CARE EDUCATION/TRAINING PROGRAM

## 2023-12-08 PROCEDURE — 86051 AQUAPORIN-4 ANTB ELISA: CPT

## 2023-12-08 PROCEDURE — 86362 MOG-IGG1 ANTB CBA EACH: CPT

## 2023-12-08 PROCEDURE — 99232 SBSQ HOSP IP/OBS MODERATE 35: CPT | Mod: 25 | Performed by: STUDENT IN AN ORGANIZED HEALTH CARE EDUCATION/TRAINING PROGRAM

## 2023-12-08 PROCEDURE — 72156 MRI NECK SPINE W/O & W/DYE: CPT

## 2023-12-08 PROCEDURE — 92523 SPEECH SOUND LANG COMPREHEN: CPT

## 2023-12-08 PROCEDURE — 84157 ASSAY OF PROTEIN OTHER: CPT

## 2023-12-08 PROCEDURE — 87798 DETECT AGENT NOS DNA AMP: CPT

## 2023-12-08 PROCEDURE — 89051 BODY FLUID CELL COUNT: CPT | Mod: 91

## 2023-12-08 PROCEDURE — 62270 DX LMBR SPI PNXR: CPT

## 2023-12-08 PROCEDURE — 70553 MRI BRAIN STEM W/O & W/DYE: CPT

## 2023-12-08 RX ADMIN — DOCUSATE SODIUM 50 MG AND SENNOSIDES 8.6 MG 2 TABLET: 8.6; 5 TABLET, FILM COATED ORAL at 04:39

## 2023-12-08 RX ADMIN — NICOTINE TRANSDERMAL SYSTEM 21 MG: 21 PATCH, EXTENDED RELEASE TRANSDERMAL at 04:40

## 2023-12-08 RX ADMIN — GADOTERIDOL 10 ML: 279.3 INJECTION, SOLUTION INTRAVENOUS at 17:36

## 2023-12-08 ASSESSMENT — ENCOUNTER SYMPTOMS
MYALGIAS: 1
WHEEZING: 1
EYES NEGATIVE: 1
COUGH: 1
FEVER: 0
PSYCHIATRIC NEGATIVE: 1
CHILLS: 0
SHORTNESS OF BREATH: 1
WEAKNESS: 1
GASTROINTESTINAL NEGATIVE: 1

## 2023-12-08 ASSESSMENT — PATIENT HEALTH QUESTIONNAIRE - PHQ9
1. LITTLE INTEREST OR PLEASURE IN DOING THINGS: NOT AT ALL
2. FEELING DOWN, DEPRESSED, IRRITABLE, OR HOPELESS: NOT AT ALL
SUM OF ALL RESPONSES TO PHQ9 QUESTIONS 1 AND 2: 0

## 2023-12-08 ASSESSMENT — PAIN DESCRIPTION - PAIN TYPE: TYPE: ACUTE PAIN

## 2023-12-08 NOTE — THERAPY
"Speech Language Pathology   Communication Evaluation     Patient Name: Rama Bhatt  AGE:  48 y.o., SEX:  female  Medical Record #: [id number]  Date of Service: 12/8/2023      History of Present Illness  Per EMR-Pt admitted with URI-COVID and right sided weakness. Pending lumbar puncture today per RN to assess for demylenating plaques per MRI.      MRI-1.  There are extensive demyelinating plaques in the subcortical and periventricular white matter, brainstem and cerebellar hemispheres. Contrast-enhanced study is needed for the assessment of any possible acute demyelinating plaque.  2.  Mild to moderate cerebral volume loss.    Exam Ended: 12/06/23 17:29 Last Resulted: 12/06/23 17:39  General Information  Vitals  O2 Delivery Device: (P) None - Room Air  Level of Consciousness: (P) Alert               Prior Living Situation & Level of Function  Comments: (P) Pt lives with her young son who works and is gone part of the day.     Communication: (P) WFL  Swallowing: (P) WFL       Subjective  (P) \"I am fine.\" regarding thinking.      Communication Domain(s)  Expressive Language: (P) Mild  Receptive Language: (P) Mild  Cognitive-Linguistic: (P)  (mild to moderate.)  Reading: (P) Mild         Assessment  The patient was seen this date for a cognitive linguistic evaluation.           Cognistat  Orientation: (P) Average 11/12    month \"8\"=8/8/23 and unable to self corret.   Comprehension: (P)  (mild to moderate) Slow processing and requires time.   Memory: (P) Moderate 6/12 Requires choice of 3 cues.  Similarities: (P) Mild 4/8  Judgement: (P) Average 6/6       Clinical Impressions  Pt with mild to mod slowness in processing verbal and written information. Mild difficulty orally reading simple paragraph and required cues for compre to answer 2/3 question correctly. Mild disorganization of numbers r/t unequal spacing of number for simple clock drawing. Pt with legible cursive writing with no spelling errors noted. " "      NOTE: It is not within the scope of practice of Speech-Language Pathologists to determine patient capacity. Please defer to the physician or psych to complete this assessment.       Recommendations  Supervision Needs Upons Discharge: (P) Intermittent assistance with IADLs (see below)  IADLs: (P) Medication management, Financial management, Appointment management, Household chores, Cooking         SLP Treatment Plan  Treatment Plan: (P) Cognitive Treatment, Speech-Language Treatment  SLP Frequency: (P) 3x Per Week  Estimated Duration: (P) Until Therapy Goals Met      Anticipated Discharge Needs  Discharge Recommendations: (P)  (continued SLP post d/c)         Patient / Family Goals  Patient / Family Goal #1: (P) \"I am fine.\"  Short Term Goal # 1: (P) Pt will state current orientation information using a calender with 100% accuracy.  Goal Outcome # 1: (P) Goal not met  Short Term Goal # 2: (P) Pt will orally read at the 3-4 sent level and answer questions with 80% accuracy and min cues.  Goal Outcome # 2 : (P) Goal not met  Short Term Goal # 3: (P) Pt will use simple written strategies for STM recall following educ with SLP and with min cues and 80% accuracy.  Goal Outcome  # 3: (P) Goal not met      Casi Quintero, SLP  "

## 2023-12-08 NOTE — CARE PLAN
The patient is Stable - Low risk of patient condition declining or worsening    Shift Goals  Clinical Goals: Scans / Supportive Care  Patient Goals: DC  Family Goals: RODOLFO    Progress made toward(s) clinical / shift goals:        Problem: Fall Risk  Goal: Patient will remain free from falls  Outcome: Progressing     Problem: Neuro Status  Goal: Neuro status will remain stable or improve  Outcome: Progressing     Problem: Self Care  Goal: Patient will have the ability to perform ADLs independently or with assistance (bathe, groom, dress, toilet and feed)  Outcome: Progressing     Problem: Mobility  Goal: Patient's capacity to carry out activities will improve  Outcome: Progressing       Patient is not progressing towards the following goals:

## 2023-12-08 NOTE — PROGRESS NOTES
[facility] Medicine Daily Progress Note    Date of Service  12/7/2023    Chief Complaint  Rama Bhatt is a 48 y.o. female admitted 12/4/2023 with URI and RIGHT sided weakness    Hospital Course  Ms. Rama Bhatt is a 48 y.o. female with history of hyperlipidemia, tobacco abuse who presented 12/4/2023 with evaluation for URI symptoms, right-sided weakness.      Patient reported not feeling well for the past 2 weeks to 4 weeks, endorsed fever, chills, cough.  She also noted to have weakness of right side which has been ongoing for the past 2 weeks.  As symptoms not resolving, patient was brought to ER for evaluation.  In ER, initially thought to be sepsis due to fever 101 Fahrenheit, tachycardic --patient received empiric Zosyn and azithromycin by ERP.  CTA head and neck did not show LVO or dissection.  Admission requested by ERP.  Admitted to medicine service for further evaluation and treatment.    In ER, patient started on Zosyn 4.5 g, azithromycin 500 mg IV, thiamine 200 mg IV, 1 L NS bolus. Pending Echo and MRI Brain.     Interval Problem Update  No acute events overnight.  Patient ambulating well, no longer having any leg weakness.  MRI brain reviewed with patient, MRI brain shows extensive demyelinating plaques.  Neurology consulted, appreciate their recommendations.  Given improvement in symptoms will hold off on steroids for now.  Can stop aspirin, statin, cardiac monitoring as stroke is ruled out.  Anticipate discharge to home when medically cleared.    I have discussed this patient's plan of care and discharge plan at IDT rounds today with Case Management, Nursing, Nursing leadership, and other members of the IDT team.    Consultants/Specialty  NONE    Code Status  Full Code    Disposition  The patient is not medically cleared for discharge to home or a post-acute facility.  Anticipate discharge to: home with close outpatient follow-up    I have placed the appropriate orders for  post-discharge needs.    Review of Systems  Review of Systems   Constitutional:  Positive for malaise/fatigue. Negative for chills and fever.   HENT: Negative.     Eyes: Negative.    Respiratory:  Positive for cough, shortness of breath and wheezing.    Cardiovascular:  Positive for chest pain.   Gastrointestinal: Negative.    Genitourinary: Negative.    Musculoskeletal:  Positive for myalgias.   Skin: Negative.    Neurological:  Positive for weakness.   Endo/Heme/Allergies: Negative.    Psychiatric/Behavioral: Negative.          Physical Exam  Temp:  [36.6 °C (97.9 °F)-37 °C (98.6 °F)] 36.7 °C (98.1 °F)  Pulse:  [49-66] 66  Resp:  [16-18] 16  BP: (101-138)/(59-82) 137/82  SpO2:  [97 %-98 %] 98 %    Physical Exam  Vitals and nursing note reviewed.   HENT:      Head: Normocephalic.      Nose: Nose normal.      Mouth/Throat:      Mouth: Mucous membranes are moist.      Pharynx: Oropharynx is clear.   Eyes:      Pupils: Pupils are equal, round, and reactive to light.   Cardiovascular:      Rate and Rhythm: Normal rate and regular rhythm.      Pulses: Normal pulses.      Heart sounds: Normal heart sounds.   Pulmonary:      Effort: Pulmonary effort is normal.      Breath sounds: Normal breath sounds.   Abdominal:      General: Bowel sounds are normal.      Palpations: Abdomen is soft.   Musculoskeletal:         General: Tenderness present.      Cervical back: Normal range of motion and neck supple.   Skin:     General: Skin is dry.      Capillary Refill: Capillary refill takes 2 to 3 seconds.   Neurological:      Mental Status: She is alert. Mental status is at baseline.         Fluids    Intake/Output Summary (Last 24 hours) at 12/7/2023 1654  Last data filed at 12/7/2023 1400  Gross per 24 hour   Intake 630 ml   Output --   Net 630 ml       Laboratory  Recent Labs     12/04/23  2031 12/05/23  0013 12/06/23  0600   WBC 7.4 5.4 4.1*   RBC 4.32 3.77* 3.76*   HEMOGLOBIN 13.4 11.4* 11.4*   HEMATOCRIT 39.7 35.0* 34.6*   MCV  91.9 92.8 92.0   MCH 31.0 30.2 30.3   MCHC 33.8 32.6 32.9   RDW 48.1 48.7 48.6   PLATELETCT 320 275 264   MPV 9.4 9.5 9.4     Recent Labs     12/04/23  2031 12/05/23  0013 12/06/23  0600   SODIUM 139 136 139   POTASSIUM 4.3 3.9 3.8   CHLORIDE 104 105 108   CO2 24 19* 22   GLUCOSE 105* 105* 111*   BUN 9 8 11   CREATININE 0.48* 0.50 0.49*   CALCIUM 8.8 7.9* 8.6     Recent Labs     12/05/23  0013   INR 1.18*         Recent Labs     12/05/23  0013   TRIGLYCERIDE 45   HDL 52   LDL 96       Imaging  MR-BRAIN-W/O   Final Result      1.  There are extensive demyelinating plaques in the subcortical and periventricular white matter, brainstem and cerebellar hemispheres. Contrast-enhanced study is needed for the assessment of any possible acute demyelinating plaque.   2.  Mild to moderate cerebral volume loss.      EC-ECHOCARDIOGRAM COMPLETE W/O CONT   Final Result      CT-CTA NECK WITH & W/O-POST PROCESSING   Final Result         1.  CT angiogram of the neck within normal limits.   2.  Diffuse enlargement of thyroid with multiple nodules, recommend follow-up thyroid sonography due to nodule size and diffusely enlarged thyroid.         CT-CTA HEAD WITH & W/O-POST PROCESS   Final Result         1.  No large vessel occlusion or aneurysm identified.   2.  Periventricular and subcortical white matter low-attenuation changes, nonspecific but may represent changes of small vessel ischemia.   3.  Mild cerebral atrophy.      DX-CHEST-PORTABLE (1 VIEW)   Final Result         1.  Hazy left lower lobe infiltrate.           Assessment/Plan  * Right sided weakness- (present on admission)  Assessment & Plan  R/o CVA  Possibly sequela of COVID  Ongoing symptoms for at least the past 2 weeks  No LVO or dissection seen on CTA head and neck  Echo is normal  MRI brain shows extensive demyelinating plaques  Patients symptoms have improved, possible due to decadron use for COVID which has been stopped as patient is not hypoxic  Hold steroids for  now as symptoms have resolved  Neurology consulted, appreciate their recommendations    Tobacco abuse  Assessment & Plan  Cessation counseling provided: 5 minutes  Offered nicotine patch, nicotine gum, Chantix as alternative.  Provided patient with standard tobacco cessation information per protocol    Acute febrile illness  Assessment & Plan  Likely secondary to COVID    Pneumonia due to COVID-19 virus  Assessment & Plan  Positive COVID in ER  Denies prior vaccination  Given Zosyn and azithromycin in ER, defer further antibiotic at this time given procalcitonin WNL  On room air, stop decadron  Assess the need for remdesivir/baricitinib daily    Mixed hyperlipidemia- (present on admission)  Assessment & Plan  Continue statin         VTE prophylaxis:   SCDs/TEDs

## 2023-12-08 NOTE — CARE PLAN
The patient is Stable - Low risk of patient condition declining or worsening    Shift Goals  Clinical Goals: Scans / Supportive Care  Patient Goals: DC  Family Goals: RODOLFO    Progress made toward(s) clinical / shift goals:  education done on POC, all questions and concerns were addressed    Patient is not progressing towards the following goals:      Problem: Fall Risk  Goal: Patient will remain free from falls  Outcome: Progressing     Problem: Neuro Status  Goal: Neuro status will remain stable or improve  Outcome: Progressing     Problem: Mobility  Goal: Patient's capacity to carry out activities will improve  Outcome: Progressing

## 2023-12-08 NOTE — CONSULTS
Neurology Initial Consult H&P  Neurohospitalist Service, [facility]    Referring Physician: Kin Kc M.D.    Chief Complaint   Patient presents with    Possible Stroke     Stroke like symptoms right side weakness x 1 month       HPI: Rama Bhatt is a 48 y.o. woman for whom neurology has been consulted for abnormal MRI brain.    Patient presented to the hospital December 4th for evaluation of upper respiratory symptoms and right-sided weakness.  Noted to be COVID-positive for which she was treated with Decadron.  Right-sided weakness has completely resolved.  Patient has not experienced any new neurologic symptoms.  Mentions that she feels well and is ready to go home.     Additional history obtained from the EMR as reported by the patient's son describing the patient experiencing right-sided weakness present for 4 months that worsened over 3 to 4 days prior to admission.  Had difficulty walking and speaking that was noticed when she arrived at work.    MRI brain without contrast was completed. Results and discussion as below.     No clear prior history to suggest CNS demyelinating clinical attack.     Review of systems: In addition to what is detailed in the HPI above, all other systems reviewed and are negative.    Past Medical History:    has a past medical history of Bell's palsy (2004).    FHx:  family history includes Diabetes in her father and mother.    SHx:   reports that she has been smoking. She has a 3.5 pack-year smoking history. She has never used smokeless tobacco. She reports that she does not currently use alcohol. She reports current drug use. Drugs: Marijuana and Inhaled.    Allergies:  Allergies   Allergen Reactions    Nkda [No Known Drug Allergy]        Medications:    Current Facility-Administered Medications:     nicotine (Nicoderm) 21 MG/24HR 21 mg, 21 mg, Transdermal, Daily-0600, 21 mg at 12/07/23 0531 **AND** Nicotine Replacement Patient Education  "Materials, , , Once **AND** nicotine polacrilex (Nicorette) 2 MG piece 2 mg, 2 mg, Oral, Q HOUR PRN, Ceasar Garcia M.D.    [Held by provider] enoxaparin (Lovenox) inj 40 mg, 40 mg, Subcutaneous, DAILY AT 1800, Ceasar Garcia M.D., 40 mg at 12/06/23 1652    senna-docusate (Pericolace Or Senokot S) 8.6-50 MG per tablet 2 Tablet, 2 Tablet, Oral, BID, 2 Tablet at 12/07/23 1825 **AND** polyethylene glycol/lytes (Miralax) Packet 1 Packet, 1 Packet, Oral, QDAY PRN **AND** bisacodyl (Dulcolax) suppository 10 mg, 10 mg, Rectal, QDAY PRN, Ceasar aGrcia M.D.    lactated ringers infusion (BOLUS), 500 mL, Intravenous, Once PRN, Ceasar Garcia M.D.    acetaminophen (Tylenol) tablet 650 mg, 650 mg, Oral, Q6HRS PRN, Ceasar Garcia M.D.    labetalol (Normodyne/Trandate) injection 10 mg, 10 mg, Intravenous, Q10 MIN PRN, Ceasar Garcia M.D.    hydrALAZINE (Apresoline) injection 10 mg, 10 mg, Intravenous, Q2HRS PRN, Ceasar Garcia M.D.    NS (Bolus) 0.9 % infusion 500 mL, 500 mL, Intravenous, Once PRN, Ceasar Garcia M.D.    LORazepam (Ativan) tablet 1 mg, 1 mg, Oral, 1X PRN, Ceasar Garcia M.D.    Physical Examination:     General: Patient is awake and in no acute distress. Sitting up on side of bed.  Eye: Examination of optic disks not indicated at this time given acuity of consult  Neck: There is normal range of motion  CV: regular rate   Extremities:  clear, dry, intact, without peripheral edema    NEUROLOGICAL EXAM:     /82   Pulse 66   Temp 36.7 °C (98.1 °F) (Temporal)   Resp 16   Ht 1.753 m (5' 9\")   Wt 65 kg (143 lb 4.8 oz)   SpO2 98%   BMI 19.99 kg/m²     Mental status: Awake, alert and oriented.  Attention is intact.  Answers questions appropriately. Mildly slowed processing speed.   Language: Fluent with intact comprehension.  No dysarthria.  Cranial nerves:    Pupils are equal, round and reactive to light  Intact visual fields  Versions are full   Intact muscles of mastication    Intact facial sensation   Face appears " symmetric   Hearing is intact to conversation   Symmetric shoulder shrug   Symmetric elevation of the palate; uvula appears midline   Tongue protrusion is midline  Motor: Normal bulk and tone. 5/5 strength in bilateral upper and lower extremities (proximal & distal muscles). No pronator drift. No abnormal movements or postures.   Reflexes: Deep tendon reflexes 3 and symmetric in the bilateral upper and lower extremities. Bilateral plantar responses are extensor; more prominent on the right.  Sensory: Grossly intact to light-touch.   Coordination: No dysmetria.  Gait: Able to stand unassisted. Gait is wide-based and cautious. Feels unsteady.     Objective Data:    Labs:  Lab Results   Component Value Date/Time    PROTHROMBTM 15.1 (H) 12/05/2023 12:13 AM    INR 1.18 (H) 12/05/2023 12:13 AM      Lab Results   Component Value Date/Time    WBC 4.1 (L) 12/06/2023 06:00 AM    RBC 3.76 (L) 12/06/2023 06:00 AM    HEMOGLOBIN 11.4 (L) 12/06/2023 06:00 AM    HEMATOCRIT 34.6 (L) 12/06/2023 06:00 AM    MCV 92.0 12/06/2023 06:00 AM    MCH 30.3 12/06/2023 06:00 AM    MCHC 32.9 12/06/2023 06:00 AM    MPV 9.4 12/06/2023 06:00 AM    NEUTSPOLYS 74.40 (H) 12/05/2023 12:13 AM    LYMPHOCYTES 11.20 (L) 12/05/2023 12:13 AM    MONOCYTES 12.50 12/05/2023 12:13 AM    EOSINOPHILS 0.20 12/05/2023 12:13 AM    BASOPHILS 1.10 12/05/2023 12:13 AM      Lab Results   Component Value Date/Time    SODIUM 139 12/06/2023 06:00 AM    POTASSIUM 3.8 12/06/2023 06:00 AM    CHLORIDE 108 12/06/2023 06:00 AM    CO2 22 12/06/2023 06:00 AM    GLUCOSE 111 (H) 12/06/2023 06:00 AM    BUN 11 12/06/2023 06:00 AM    CREATININE 0.49 (L) 12/06/2023 06:00 AM    CREATININE 0.8 12/13/2006 06:00 AM      Lab Results   Component Value Date/Time    CHOLSTRLTOT 157 12/05/2023 12:13 AM    LDL 96 12/05/2023 12:13 AM    HDL 52 12/05/2023 12:13 AM    TRIGLYCERIDE 45 12/05/2023 12:13 AM       Lab Results   Component Value Date/Time    ALKPHOSPHAT 92 12/05/2023 12:13 AM    ASTSGOT 32  12/05/2023 12:13 AM    ALTSGPT 22 12/05/2023 12:13 AM    TBILIRUBIN 0.2 12/05/2023 12:13 AM        Imaging/Testing:    I interpreted and/or reviewed the patient's neuroimaging    MR-BRAIN-W/O   Final Result      1.  There are extensive demyelinating plaques in the subcortical and periventricular white matter, brainstem and cerebellar hemispheres. Contrast-enhanced study is needed for the assessment of any possible acute demyelinating plaque.   2.  Mild to moderate cerebral volume loss.      EC-ECHOCARDIOGRAM COMPLETE W/O CONT   Final Result      CT-CTA NECK WITH & W/O-POST PROCESSING   Final Result         1.  CT angiogram of the neck within normal limits.   2.  Diffuse enlargement of thyroid with multiple nodules, recommend follow-up thyroid sonography due to nodule size and diffusely enlarged thyroid.         CT-CTA HEAD WITH & W/O-POST PROCESS   Final Result         1.  No large vessel occlusion or aneurysm identified.   2.  Periventricular and subcortical white matter low-attenuation changes, nonspecific but may represent changes of small vessel ischemia.   3.  Mild cerebral atrophy.      DX-CHEST-PORTABLE (1 VIEW)   Final Result         1.  Hazy left lower lobe infiltrate.          Impression and Recommendations: Rama Bhatt is a 48 y.o. woman for whom neurology has been consulted for abnormal MRI brain.  Clinically presented with upper respiratory infection and right-sided weakness who was subsequently diagnosed with COVID for which she was treated with supportive care and a dose of decadron. Right-sided weakness has resolved.  Patient has not experienced any new neurologic symptoms.  Neurologic exam is most significant for extensor plantar response as well as an abnormal gait that is wide-based and cautious.  MRI brain without contrast reveals extensive multifocal demyelinating lesions suggestive of multiple sclerosis (MS). No clear red flag features to indicate MS mimic identified as this time.  I  recommended to the patient to remain hospitalized for additional workup; she agreed to stay.  No clear indication to continue steroid treatment or other modality at this time.  - MRI brain with and without contrast.  - MRI C and T-spine with and without contrast.  - Lumbar puncture for CSF analysis.  Orders are in.  - Please send NMO and MOG antibodies.  - PT/OT.     Neurology will follow along.       Bright Alanis MD  Neurohospitalist, Acute Care Services      The evaluation of the patient, and recommended management, was discussed with Dr. Kc     I personally provided 60 minutes of total acute neurologic care time outside of time spent on separately billable/documented procedures. Time includes: review of laboratory data, review of radiology studies, discussion with consultants, discussion with family/patient, monitoring for potential decompensation.  Interventions were performed as documented in the chart.        Please note that this dictation was created using voice recognition software.  I have made every reasonable attempt to correct obvious errors, but I expect that there are errors of grammar and possibly content that I did not discover before finalizing the note.

## 2023-12-09 VITALS
SYSTOLIC BLOOD PRESSURE: 114 MMHG | TEMPERATURE: 98.4 F | HEIGHT: 69 IN | HEART RATE: 75 BPM | WEIGHT: 143.3 LBS | OXYGEN SATURATION: 97 % | RESPIRATION RATE: 18 BRPM | DIASTOLIC BLOOD PRESSURE: 78 MMHG | BODY MASS INDEX: 21.22 KG/M2

## 2023-12-09 LAB
BACTERIA BLD CULT: NORMAL
BACTERIA BLD CULT: NORMAL
SIGNIFICANT IND 70042: NORMAL
SIGNIFICANT IND 70042: NORMAL
SITE SITE: NORMAL
SITE SITE: NORMAL
SOURCE SOURCE: NORMAL
SOURCE SOURCE: NORMAL

## 2023-12-09 PROCEDURE — 700102 HCHG RX REV CODE 250 W/ 637 OVERRIDE(OP): Performed by: STUDENT IN AN ORGANIZED HEALTH CARE EDUCATION/TRAINING PROGRAM

## 2023-12-09 PROCEDURE — A9270 NON-COVERED ITEM OR SERVICE: HCPCS | Performed by: STUDENT IN AN ORGANIZED HEALTH CARE EDUCATION/TRAINING PROGRAM

## 2023-12-09 PROCEDURE — 99232 SBSQ HOSP IP/OBS MODERATE 35: CPT | Performed by: PSYCHIATRY & NEUROLOGY

## 2023-12-09 PROCEDURE — 99239 HOSP IP/OBS DSCHRG MGMT >30: CPT | Performed by: STUDENT IN AN ORGANIZED HEALTH CARE EDUCATION/TRAINING PROGRAM

## 2023-12-09 RX ADMIN — NICOTINE TRANSDERMAL SYSTEM 21 MG: 21 PATCH, EXTENDED RELEASE TRANSDERMAL at 04:35

## 2023-12-09 RX ADMIN — DOCUSATE SODIUM 50 MG AND SENNOSIDES 8.6 MG 2 TABLET: 8.6; 5 TABLET, FILM COATED ORAL at 04:35

## 2023-12-09 ASSESSMENT — PAIN DESCRIPTION - PAIN TYPE
TYPE: ACUTE PAIN
TYPE: ACUTE PAIN

## 2023-12-09 NOTE — PROCEDURES
Procedure Lumbar Puncture    Date/Time: 12/8/2023 4:56 PM    Performed by: Winston Stacy M.D.  Authorized by: Winston Stacy M.D.    Consent:     Consent obtained:  Verbal, written and emergent situation    Consent given by:  Patient    Risks discussed:  Bleeding, infection, pain, repeat procedure, nerve damage and headache    Alternatives discussed:  No treatment  Pre-procedure details:     Procedure purpose:  Diagnostic  Sedation:     Sedation type:  Anxiolysis  Anesthesia:     Anesthesia method:  Local infiltration    Local anesthetic:  Bupivacaine 0.25% WITH epi  Procedure details:     Lumbar space:  L4-L5 interspace    Patient position:  Sitting    Needle gauge:  18    Needle type:  Leslie point    Needle length (in):  1.5    Ultrasound guidance: no      Number of attempts:  4    Fluid appearance:  Cloudy and clear    Tubes of fluid:  4    Total volume (ml):  15  Post-procedure:     Puncture site:  Adhesive bandage applied and direct pressure applied    Patient tolerance of procedure:  Tolerated well, no immediate complications

## 2023-12-09 NOTE — PROGRESS NOTES
"Referring Physician: Kin Kc M.D.    S: Patient reports that she feels good this morning.  No new neurologic symptoms.  Gait is improving.  Completed lumbar puncture as well as repeat MRI imaging of the brain and spine.    O:    Vitals:    12/09/23 0708   BP: 114/78   Pulse: 75   Resp: 18   Temp: 36.9 °C (98.4 °F)   SpO2: 97%     Mental status: Awake and alert.  Attention is intact.  Answers questions appropriately. Mildly slowed processing speed.   Language: Fluent with intact comprehension.  No dysarthria.  Cranial nerves:               Pupils are equal, round and reactive to light              Face appears symmetric              Hearing is intact to conversation  Motor: Moves all extremities symmetrically.  Antigravity without drift.  No abnormal movements or postures.   Reflexes: Not tested.   Sensory: Grossly intact to light-touch.   Coordination: No dysmetria.  Gait: Able to stand unassisted. Gait is slightly wide-based and cautious. Feels more steady today.      Latest Reference Range & Units 12/08/23 16:00   Number Of Tubes  4  4   Volume mL  mL 13.0  13.0   Color-Body Fluid  Colorless  Colorless   Character-Body Fluid  Clear  Clear   Supernatant Appearance  Colorless  Colorless   CSF Total Nucleated Cells 0 - 10 cells/uL  0 - 10 cells/uL 1  1   Total RBC Count cells/uL  cells/uL <1  32   Crenated RBC %  % 0  0   CSF Tube Number  Tube 4  Tube 1   Comments  See Comment  See Comment   Glucose CSF 40 - 80 mg/dL 64   Total Protein, CSF 15 - 45 mg/dL 27     B12 403  CSF gram stain- no organisms  CSF culture is pending    MRI BRAIN IMPRESSION:  1.  Mild to moderate diffuse cerebral atrophy.     2.  Extensive chronic areas of demyelination throughout the periventricular white matter. The size and number of demyelinating plaques is not changed since inferiorly since previous exam consistent with the patient's known diagnosis of multiple sclerosis.     3.  No \"active\" enhancing lesions in the brain " "parenchyma.    MRI C-SPINE IMPRESSION:  1.  No definite demyelinating lesions in the cervical cord or areas of \"active\" enhancement.     2.  Moderate to severe multilevel neural foraminal stenosis secondary to uncinate hypertrophy most pronounced on the left at the C5-6 level.    MRI T-SPINE IMPRESSION:  1. Mild discal degenerative changes in the mid to lower thoracic spine.     2. No evidence of demyelinating lesions in the thoracic cord or evidence of cord compression.     Impression & Recommendations:  Rama Bhatt is a 48 y.o. woman for whom neurology has been consulted for abnormal MRI brain.  Clinically presented with upper respiratory infection and right-sided weakness who was subsequently diagnosed with COVID for which she was treated with supportive care and a dose of decadron. Right-sided weakness has resolved.  Patient has not experienced any new neurologic symptoms.  Neurologic exam is most significant for extensor plantar response as well as an abnormal gait that is wide-based and cautious.  MRI brain without contrast reveals extensive multifocal demyelinating lesions suggestive of multiple sclerosis (MS). No clear red flag features to indicate MS mimic identified as this time.  Interval MRI of the C and T-spine with and without contrast does not reveal any clear demyelinating lesions.  Initial CSF bland appearing.  No clear indication to continue steroid treatment or other modality at this time. Feels well this morning. Eagerly awaiting discharge home.    Okay for discharge home from a neurologic perspective.  Please place referral for the patient to establish care with an outpatient neurologist.  Pending studies include CSF culture, bands, NMO and MOG antibodies, EBV, STEPHEN and JCV.       I provided a total of 35 minutes of acute neurologic care for this patient encounter reviewing medical records, diagnostic studies, direct face-to-face time with the patient, documentation, and communicating " plan of care.      Bright Alanis MD  Neurohospitalist, Acute Care Services          Please note that this dictation was created using voice recognition software.  I have made every reasonable attempt to correct obvious errors, but I expect that there are errors of grammar and possibly content that I did not discover before finalizing the note.

## 2023-12-09 NOTE — CARE PLAN
The patient is Stable - Low risk of patient condition declining or worsening    Shift Goals  Clinical Goals: safety, stable neuro status  Patient Goals: discharge  Family Goals: vivian    Progress made toward(s) clinical / shift goals:    Problem: Fall Risk  Goal: Patient will remain free from falls  Outcome: Progressing     Problem: Neuro Status  Goal: Neuro status will remain stable or improve  Outcome: Progressing       Patient is not progressing towards the following goals:

## 2023-12-09 NOTE — DISCHARGE SUMMARY
Discharge Summary    CHIEF COMPLAINT ON ADMISSION  Chief Complaint   Patient presents with    Possible Stroke     Stroke like symptoms right side weakness x 1 month       Reason for Admission  EMS     Admission Date  12/4/2023    CODE STATUS  Prior    HPI & HOSPITAL COURSE  Ms. Rama Bhatt is a 48 y.o. female with history of hyperlipidemia, tobacco abuse who presented 12/4/2023 with evaluation for URI symptoms, right-sided weakness.  Patient found to have right sided leg weakness, with fever and tachycardia. She was initially treated with IV antibiotics, found to be COVID positive and so antibiotics stopped. She remained on room air. CTA head and neck performed for stroke evaluation which was normal. MRI brain showed extensive demyelination with no active plaques. Neurology consulted who recommended additional MRI spine and lumbar puncture testing which was completed. Patient's leg weakness resolved. No need for steroid treatment for suspected multiple sclerosis. Patient is feeling well. Neurology referral placed. Patient is to follow up with PCP and neurology for continued management of suspected multiple sclerosis.      In ER, patient started on Zosyn 4.5 g, azithromycin 500 mg IV, thiamine 200 mg IV, 1 L NS bolus. Pending Echo and MRI Brain.     Therefore, she is discharged in fair and stable condition to home with close outpatient follow-up.    The patient met 2-midnight criteria for an inpatient stay at the time of discharge.    Discharge Date  12/9/2023    FOLLOW UP ITEMS POST DISCHARGE  Take medications as prescribed.  Follow up with PCP and neurology.    DISCHARGE DIAGNOSES  Principal Problem:    Right sided weakness (POA: Yes)  Active Problems:    Mixed hyperlipidemia (POA: Yes)      Overview: Patient here for follow-up, was noted to have mixed hyperlipidemia on       recent lab work.      Lab Results       Component Value Date/Time        CHOLSTRLTOT 251 (H) 03/21/2022 12:24 PM         (H)  03/21/2022 12:24 PM        HDL 44 03/21/2022 12:24 PM        TRIGLYCERIDE 208 (H) 03/21/2022 12:24 PM                   Pneumonia due to COVID-19 virus (POA: Unknown)    Acute febrile illness (POA: Unknown)    Tobacco abuse (POA: Unknown)  Resolved Problems:    * No resolved hospital problems. *      FOLLOW UP  No future appointments.  Tila Quiñones A.P.RSuziN.  [address]  [address]  [facility] [address]  [phone]    Follow up in 1 week(s)      [facility]  [address]  [place] [address]  [phone]  Follow up in 2 week(s)        MEDICATIONS ON DISCHARGE     Medication List        CONTINUE taking these medications        Instructions   simvastatin 40 MG Tabs  Commonly known as: Zocor   TAKE 1 TABLET BY MOUTH EVERY DAY IN THE EVENING              Allergies  Allergies   Allergen Reactions    Nkda [No Known Drug Allergy]        DIET  No orders of the defined types were placed in this encounter.      ACTIVITY  As tolerated.  Weight bearing as tolerated    CONSULTATIONS  neurology    PROCEDURES  Lumbar puncture    LABORATORY  Lab Results   Component Value Date    SODIUM 139 12/06/2023    POTASSIUM 3.8 12/06/2023    CHLORIDE 108 12/06/2023    CO2 22 12/06/2023    GLUCOSE 111 (H) 12/06/2023    BUN 11 12/06/2023    CREATININE 0.49 (L) 12/06/2023    CREATININE 0.8 12/13/2006        Lab Results   Component Value Date    WBC 5.8 12/08/2023    HEMOGLOBIN 12.4 12/08/2023    HEMATOCRIT 37.5 12/08/2023    PLATELETCT 300 12/08/2023        Total time of the discharge process exceeds 35 minutes.

## 2023-12-09 NOTE — PROGRESS NOTES
[facility] Medicine Daily Progress Note    Date of Service  12/8/2023    Chief Complaint  Rama Bhatt is a 48 y.o. female admitted 12/4/2023 with URI and RIGHT sided weakness    Hospital Course  Ms. Rama Bhatt is a 48 y.o. female with history of hyperlipidemia, tobacco abuse who presented 12/4/2023 with evaluation for URI symptoms, right-sided weakness.      Patient reported not feeling well for the past 2 weeks to 4 weeks, endorsed fever, chills, cough.  She also noted to have weakness of right side which has been ongoing for the past 2 weeks.  As symptoms not resolving, patient was brought to ER for evaluation.  In ER, initially thought to be sepsis due to fever 101 Fahrenheit, tachycardic --patient received empiric Zosyn and azithromycin by ERP.  CTA head and neck did not show LVO or dissection.  Admission requested by ERP.  Admitted to medicine service for further evaluation and treatment.    In ER, patient started on Zosyn 4.5 g, azithromycin 500 mg IV, thiamine 200 mg IV, 1 L NS bolus. Pending Echo and MRI Brain.     Interval Problem Update  No acute events overnight.  Patient feeling well.  Neurology recommend MRI brain, c/t spine and LP.  LP performed today, labs sent.  MRI pending.  Appreciate neurology recommendations.  Anticipate discharge to home when medically cleared.      I have discussed this patient's plan of care and discharge plan at IDT rounds today with Case Management, Nursing, Nursing leadership, and other members of the IDT team.    Consultants/Specialty  NONE    Code Status  Full Code    Disposition  The patient is not medically cleared for discharge to home or a post-acute facility.  Anticipate discharge to: home with close outpatient follow-up    I have placed the appropriate orders for post-discharge needs.    Review of Systems  Review of Systems   Constitutional:  Positive for malaise/fatigue. Negative for chills and fever.   HENT: Negative.     Eyes: Negative.     Respiratory:  Positive for cough, shortness of breath and wheezing.    Cardiovascular:  Positive for chest pain.   Gastrointestinal: Negative.    Genitourinary: Negative.    Musculoskeletal:  Positive for myalgias.   Skin: Negative.    Neurological:  Positive for weakness.   Endo/Heme/Allergies: Negative.    Psychiatric/Behavioral: Negative.          Physical Exam  Temp:  [36.5 °C (97.7 °F)-37 °C (98.6 °F)] 36.9 °C (98.4 °F)  Pulse:  [55-67] 61  Resp:  [16] 16  BP: ()/(54-62) 104/62  SpO2:  [95 %-97 %] 97 %    Physical Exam  Vitals and nursing note reviewed.   HENT:      Head: Normocephalic.      Nose: Nose normal.      Mouth/Throat:      Mouth: Mucous membranes are moist.      Pharynx: Oropharynx is clear.   Eyes:      Pupils: Pupils are equal, round, and reactive to light.   Cardiovascular:      Rate and Rhythm: Normal rate and regular rhythm.      Pulses: Normal pulses.      Heart sounds: Normal heart sounds.   Pulmonary:      Effort: Pulmonary effort is normal.      Breath sounds: Normal breath sounds.   Abdominal:      General: Bowel sounds are normal.      Palpations: Abdomen is soft.   Musculoskeletal:         General: Tenderness present.      Cervical back: Normal range of motion and neck supple.   Skin:     General: Skin is dry.      Capillary Refill: Capillary refill takes 2 to 3 seconds.   Neurological:      Mental Status: She is alert. Mental status is at baseline.         Fluids    Intake/Output Summary (Last 24 hours) at 12/8/2023 1916  Last data filed at 12/7/2023 2000  Gross per 24 hour   Intake 240 ml   Output --   Net 240 ml         Laboratory  Recent Labs     12/06/23  0600 12/08/23  0451   WBC 4.1* 5.8   RBC 3.76* 4.06*   HEMOGLOBIN 11.4* 12.4   HEMATOCRIT 34.6* 37.5   MCV 92.0 92.4   MCH 30.3 30.5   MCHC 32.9 33.1   RDW 48.6 47.9   PLATELETCT 264 300   MPV 9.4 9.6       Recent Labs     12/06/23  0600   SODIUM 139   POTASSIUM 3.8   CHLORIDE 108   CO2 22   GLUCOSE 111*   BUN 11    CREATININE 0.49*   CALCIUM 8.6                         Imaging  MR-BRAIN-W/O   Final Result      1.  There are extensive demyelinating plaques in the subcortical and periventricular white matter, brainstem and cerebellar hemispheres. Contrast-enhanced study is needed for the assessment of any possible acute demyelinating plaque.   2.  Mild to moderate cerebral volume loss.      EC-ECHOCARDIOGRAM COMPLETE W/O CONT   Final Result      CT-CTA NECK WITH & W/O-POST PROCESSING   Final Result         1.  CT angiogram of the neck within normal limits.   2.  Diffuse enlargement of thyroid with multiple nodules, recommend follow-up thyroid sonography due to nodule size and diffusely enlarged thyroid.         CT-CTA HEAD WITH & W/O-POST PROCESS   Final Result         1.  No large vessel occlusion or aneurysm identified.   2.  Periventricular and subcortical white matter low-attenuation changes, nonspecific but may represent changes of small vessel ischemia.   3.  Mild cerebral atrophy.      DX-CHEST-PORTABLE (1 VIEW)   Final Result         1.  Hazy left lower lobe infiltrate.      MR-BRAIN-WITH & W/O    (Results Pending)   MR-CERVICAL SPINE-WITH & W/O    (Results Pending)   MR-THORACIC SPINE-WITH & W/O    (Results Pending)        Assessment/Plan  * Right sided weakness- (present on admission)  Assessment & Plan  R/o CVA  Possibly sequela of COVID  Ongoing symptoms for at least the past 2 weeks  No LVO or dissection seen on CTA head and neck  Echo is normal  MRI brain shows extensive demyelinating plaques  Patients symptoms have improved, possible due to decadron use for COVID which has been stopped as patient is not hypoxic  Hold steroids for now as symptoms have resolved  Neurology consulted, appreciate their recommendations    Tobacco abuse  Assessment & Plan  Cessation counseling provided: 5 minutes  Offered nicotine patch, nicotine gum, Chantix as alternative.  Provided patient with standard tobacco cessation information  per protocol    Acute febrile illness  Assessment & Plan  Likely secondary to COVID    Pneumonia due to COVID-19 virus  Assessment & Plan  Positive COVID in ER  Denies prior vaccination  Given Zosyn and azithromycin in ER, defer further antibiotic at this time given procalcitonin WNL  On room air, stop decadron  Assess the need for remdesivir/baricitinib daily    Mixed hyperlipidemia- (present on admission)  Assessment & Plan  Continue statin         VTE prophylaxis:    enoxaparin ppx

## 2023-12-10 LAB — ACE SERPL-CCNC: 46 U/L (ref 16–85)

## 2023-12-11 LAB
ALB CSF/SERPL: 3.2 RATIO (ref 0–9)
ALBUMIN CSF-MCNC: 11 MG/DL (ref 0–35)
ALBUMIN SERPL-MCNC: 3491 MG/DL (ref 3500–5200)
AQP4 H2O CHANNEL AB SERPL IA-ACNC: <1.5 U/ML
BACTERIA CSF CULT: NORMAL
GRAM STN SPEC: NORMAL
IGG CSF-MCNC: 6 MG/DL (ref 0–6)
IGG SERPL-MCNC: 955 MG/DL (ref 768–1632)
IGG SYNTH RATE SER+CSF CALC-MRATE: 19.5 MG/D
IGG/ALB CLEAR SER+CSF-RTO: 1.99 RATIO (ref 0.28–0.66)
IGG/ALB CSF: 0.55 RATIO (ref 0.09–0.25)
OLIGOCLONAL BANDS CSF ELPH-IMP: ABNORMAL
OLIGOCLONAL BANDS CSF ELPH-IMP: POSITIVE
OLIGOCLONAL BANDS CSF IEF: 16 BANDS (ref 0–1)
SIGNIFICANT IND 70042: NORMAL
SITE SITE: NORMAL
SOURCE SOURCE: NORMAL

## 2023-12-12 LAB
AQP4 H2O CHANNEL IGG SERPL QL IF: NORMAL
JC VIRUS SOURCE  Q4279: NORMAL
JCPYV DNA SERPL QL NAA+PROBE: NOT DETECTED
MOG AB SER QL CBA IFA: NORMAL
MOG AB SER QL CBA IFA: NORMAL